# Patient Record
Sex: FEMALE | Race: WHITE | Employment: FULL TIME | URBAN - METROPOLITAN AREA
[De-identification: names, ages, dates, MRNs, and addresses within clinical notes are randomized per-mention and may not be internally consistent; named-entity substitution may affect disease eponyms.]

---

## 2017-12-13 ENCOUNTER — OFFICE VISIT (OUTPATIENT)
Dept: FAMILY MEDICINE CLINIC | Age: 47
End: 2017-12-13

## 2017-12-13 VITALS
RESPIRATION RATE: 20 BRPM | DIASTOLIC BLOOD PRESSURE: 87 MMHG | BODY MASS INDEX: 33.99 KG/M2 | HEART RATE: 78 BPM | HEIGHT: 65 IN | TEMPERATURE: 97.5 F | OXYGEN SATURATION: 96 % | WEIGHT: 204 LBS | SYSTOLIC BLOOD PRESSURE: 130 MMHG

## 2017-12-13 DIAGNOSIS — L70.0 ACNE VULGARIS: Primary | ICD-10-CM

## 2017-12-13 DIAGNOSIS — K21.9 GASTROESOPHAGEAL REFLUX DISEASE, ESOPHAGITIS PRESENCE NOT SPECIFIED: ICD-10-CM

## 2017-12-13 DIAGNOSIS — R51.9 NONINTRACTABLE EPISODIC HEADACHE, UNSPECIFIED HEADACHE TYPE: ICD-10-CM

## 2017-12-13 DIAGNOSIS — M19.90 ARTHRITIS: ICD-10-CM

## 2017-12-13 DIAGNOSIS — F41.8 SITUATIONAL ANXIETY: ICD-10-CM

## 2017-12-13 DIAGNOSIS — E66.9 OBESITY (BMI 30.0-34.9): ICD-10-CM

## 2017-12-13 RX ORDER — BUTALBITAL AND ACETAMINOPHEN 325; 50 MG/1; MG/1
TABLET ORAL
COMMUNITY
End: 2017-12-13 | Stop reason: SDUPTHER

## 2017-12-13 RX ORDER — OMEPRAZOLE 40 MG/1
40 CAPSULE, DELAYED RELEASE ORAL DAILY
Qty: 30 CAP | Refills: 6 | Status: SHIPPED | OUTPATIENT
Start: 2017-12-13 | End: 2018-12-11 | Stop reason: SDUPTHER

## 2017-12-13 RX ORDER — LORAZEPAM 1 MG/1
TABLET ORAL
COMMUNITY
End: 2017-12-13 | Stop reason: SDUPTHER

## 2017-12-13 RX ORDER — OMEPRAZOLE 40 MG/1
40 CAPSULE, DELAYED RELEASE ORAL DAILY
COMMUNITY
End: 2017-12-13 | Stop reason: SDUPTHER

## 2017-12-13 RX ORDER — LORAZEPAM 1 MG/1
1 TABLET ORAL
Qty: 30 TAB | Refills: 0 | Status: SHIPPED | OUTPATIENT
Start: 2017-12-13 | End: 2018-12-11 | Stop reason: SDUPTHER

## 2017-12-13 RX ORDER — CELECOXIB 200 MG/1
CAPSULE ORAL 2 TIMES DAILY
COMMUNITY
End: 2017-12-13 | Stop reason: SDUPTHER

## 2017-12-13 RX ORDER — BUTALBITAL, ACETAMINOPHEN AND CAFFEINE 50; 325; 40 MG/1; MG/1; MG/1
1 TABLET ORAL
Qty: 30 TAB | Refills: 2 | Status: SHIPPED | OUTPATIENT
Start: 2017-12-13 | End: 2018-11-28 | Stop reason: SDUPTHER

## 2017-12-13 RX ORDER — RANITIDINE 150 MG/1
150 TABLET, FILM COATED ORAL
Qty: 60 TAB | Refills: 11 | Status: SHIPPED | OUTPATIENT
Start: 2017-12-13 | End: 2018-12-11 | Stop reason: SDUPTHER

## 2017-12-13 RX ORDER — CELECOXIB 200 MG/1
200 CAPSULE ORAL 2 TIMES DAILY
Qty: 60 CAP | Refills: 6 | Status: SHIPPED | OUTPATIENT
Start: 2017-12-13 | End: 2018-05-29 | Stop reason: SDUPTHER

## 2017-12-13 RX ORDER — SPIRONOLACTONE 50 MG/1
50 TABLET, FILM COATED ORAL 3 TIMES DAILY
Qty: 90 TAB | Refills: 6 | Status: SHIPPED | OUTPATIENT
Start: 2017-12-13 | End: 2018-11-28 | Stop reason: SDUPTHER

## 2017-12-13 RX ORDER — SPIRONOLACTONE 50 MG/1
TABLET, FILM COATED ORAL 3 TIMES DAILY
COMMUNITY
End: 2017-12-13 | Stop reason: SDUPTHER

## 2017-12-13 NOTE — PROGRESS NOTES
OFFICE NOTE    Whit Kendrick is a 49 y.o. female presenting today for office visit. 12/13/2017  2:14 PM    Chief Complaint   Patient presents with   [address]     pt here to establish care    Medication Refill     pt here for medication refill       HPI: Here today as a new patient, establishing care. Used to follow with Dr Allyson Woodson at [facility]- no records available for review. States that labs last done about 3 months ago. Does not follow with any specialists at this time. Acne/GERD: Currently taking Spironolactone TID for adult acne- chronic medication- keeps condition controlled. Originally started by dermatology- wishes to continue. Takes Prilosec PRN for control of GERD- heard that it has some bad effects to use long term. Has heartburn with eating of red sauce pasta or spicy foods. Headaches/Arthritis: Intermittent headaches that have been happening for years- no changes in chronic headaches. Managed with PRN Fioricet. Taking Celebrex for arthritis that she reports has been diagnosed in bilateral knees- keeps pain controlled. Situational anxiety: Diagnosed with in the past- has trouble at times with stressful times, during her menstrual cycle, and with high bridges. Typically uses Ativan 1 mg about 2-4 times per month. Denies any depression or generalized anxiety. Would like to discuss weight loss and the medication Contrave. Has been on Qsymia in the past and saw weight clinics before. Reports that as her weight increases, her knee pain worsens. Reports that she eats a healthy diet. Review of Systems   Constitutional: Negative for appetite change, chills, fatigue, fever and unexpected weight change. Respiratory: Negative for cough, shortness of breath and wheezing. Cardiovascular: Negative for chest pain, palpitations and leg swelling. Gastrointestinal: Negative for abdominal pain, constipation, diarrhea, nausea and vomiting.    Genitourinary: Negative for difficulty urinating and frequency. Musculoskeletal: Positive for arthralgias. Negative for myalgias. Skin: Negative for rash. Neurological: Negative for dizziness and headaches. Psychiatric/Behavioral: Negative for agitation, behavioral problems, decreased concentration, dysphoric mood, hallucinations, self-injury, sleep disturbance and suicidal ideas. The patient is not nervous/anxious and is not hyperactive. PHQ Screening   PHQ over the last two weeks 12/13/2017   Little interest or pleasure in doing things Not at all   Feeling down, depressed or hopeless Not at all   Total Score PHQ 2 0         History  Past Medical History:   Diagnosis Date    Acne     Arthritis of knee     patient reported    GERD (gastroesophageal reflux disease)     Headache     Situational anxiety        Past Surgical History:   Procedure Laterality Date    HX HEENT      EYE SURGERY AS A CHILD       Social History     Social History    Marital status:      Spouse name: N/A    Number of children: N/A    Years of education: N/A     Occupational History    Not on file. Social History Main Topics    Smoking status: Never Smoker    Smokeless tobacco: Never Used    Alcohol use No    Drug use: No    Sexual activity: Yes     Other Topics Concern    Not on file     Social History Narrative    No narrative on file       Family History   Problem Relation Age of Onset    Cancer Mother     Arthritis-osteo Mother     Arthritis-osteo Father     Arthritis-osteo Maternal Grandfather        Allergies   Allergen Reactions    Latex Hives    Tramadol Hives       Current Outpatient Prescriptions   Medication Sig Dispense Refill    spironolactone (ALDACTONE) 50 mg tablet Take  by mouth three (3) times daily.  celecoxib (CELEBREX) 200 mg capsule Take  by mouth two (2) times a day.  omeprazole (PRILOSEC) 40 mg capsule Take 40 mg by mouth daily.       LORazepam (ATIVAN) 1 mg tablet Take  by mouth every eight (8) hours as needed for Anxiety.  butalbital-acetaminophen (PHRENILIN)  mg tablet Take  by mouth. Health Maintenance and Screenings  Colon Cancer: . Not indicated for early screening   COPD Screen/ Lung Cancer: Nonsmoker  CAD Screen: Will get records from [facility]  Diabetes: As above  STD and HIV Screen:   Breast Cancer: As above  Cervical Cancer: Reports that she has not had in about 11 years- recommend well woman  Osteoporosis Screen: Not indicated for early screening   Abdominal Aneurysm Screen: Not indicated  Opthalmology:   Dentistry Services:   Hearing Impairment:   Skin Cancer Screen:   Obesity Screen: Body mass index is 33.95 kg/(m^2). *I have reviewed/discussed the above normal BMI with the patient. I have recommended the following interventions: dietary management education, guidance, and counseling, encourage exercise and monitor weight . Flu Vaccination: Had 17/18 season  Pneumococcal Vaccine: Not indicated for early vaccination  Shingles Vaccine: Not indicated for early vaccination  Tetanus Booster: Unsure of last booster- no medical indication today  Hepatitis B Vaccinations:     *will follow up on      Advance Care Planning:   Patient was offered the opportunity to discuss advance care planning NO   Does patient have an Advance Directive:  NO   If no, did you provide information on [facility]ctions? Patient Care Team:  Patient Care Team:  Jose L Quiñonez NP as PCP - General (Nurse Practitioner)        LABS:  None new to review    RADIOLOGY:  None new to review        Physical Exam   Constitutional: She is oriented to person, place, and time. She appears well-developed and well-nourished. No distress. Neck: Normal range of motion. Neck supple. No thyromegaly present. Cardiovascular: Normal rate, regular rhythm and normal heart sounds. No murmur heard. Pulmonary/Chest: Effort normal and breath sounds normal. No respiratory distress. Abdominal: Soft. Bowel sounds are normal. There is no tenderness. Musculoskeletal: She exhibits no edema. -pain reported bilateral knees with ROM   Neurological: She is alert and oriented to person, place, and time. She exhibits normal muscle tone. Coordination and gait normal.   Skin: Skin is warm and dry. Psychiatric: Her speech is normal and behavior is normal. Judgment normal. Her mood appears not anxious. She is not hyperactive, not withdrawn and not actively hallucinating. Cognition and memory are normal. She does not express impulsivity. She does not exhibit a depressed mood. She expresses no homicidal and no suicidal ideation. Vitals:    12/13/17 1349   BP: 130/87   Pulse: 78   Resp: 20   Temp: 97.5 °F (36.4 °C)   TempSrc: Oral   SpO2: 96%   Weight: 204 lb (92.5 kg)   Height: 5' 5\" (1.651 m)   PainSc:   5   PainLoc: Knee   LMP: 12/08/2017       Controlled Substance Review:  Patient has situational anxiety, primarily affecting episodic times in her life. Control: Current: well controlled    Activities of Daily Living: well controlled; Are you able to do your normal daily activities? yes. Is there anything you still wish you could do but can't? no.    Goals of therapy: anxiety management  Adverse side effects: Since starting your medicine are you experiencing any of the following? Constipation, fatigue, lapses in memory, depression or sexual dysfunction no. Pill count: Consistent with patient's prescription? N/A- bottles not brought to visit. Urine drug screen: N/A at this time. [facility] Prescription Monitoring [facility] check performed:  yes  Agreement on file: Not of file. Specialist(s) involved: none at this time    Aberrant behaviors: None. Concurrent opioid use: no  Concurrent use of other sedating meds: no  Self dose escalation: no  Increased risk of adverse effects: Such as pre-existing constipation, nausea, pulmonary disease, and/or cognitive impairment.  no    Education: Patient education on safety: Reviewed side effects, adverse effects, predicted effectiveness, and plan. Take medications as prescribed; do not break, crush, or chew medication unless directed; do not drink alcohol or take illegal drugs; do not drive or operate machinery until you can think clearly; keep medicine in safe and secure place; check with provider before using any OTC medications; chronic use leads to dependency and withdrawal symptoms may occur if you stop taking. Continue current plan: Established plan today  Changes made: Plan established- Ativan 1 mg #30 tablets for 6-12 months        Assessment and Plan    Acne vulgaris  *Refilled. Stable on medication. Will obtain last labs and repeat every 6-12 months as needed. - spironolactone (ALDACTONE) 50 mg tablet; Take 1 Tab by mouth three (3) times daily. Dispense: 90 Tab; Refill: 6    Gastroesophageal reflux disease, esophagitis presence not specified  *Refilled on Prilosec, trial of Zantac ordered to replace PPI if possible. - raNITIdine (ZANTAC) 150 mg tablet; Take 1 Tab by mouth every twelve (12) hours as needed for Indigestion. Dispense: 60 Tab; Refill: 11  - omeprazole (PRILOSEC) 40 mg capsule; Take 1 Cap by mouth daily. Dispense: 30 Cap; Refill: 6    Nonintractable episodic headache, unspecified headache type  *Refilled. Effective at management. - butalbital-acetaminophen-caffeine (FIORICET, ESGIC) -40 mg per tablet; Take 1 Tab by mouth every twelve (12) hours as needed for Headache. Max Daily Amount: 2 Tabs. Dispense: 30 Tab; Refill: 2    Arthritis  *Refilled. Has been effective at managing. Will await records. - celecoxib (CELEBREX) 200 mg capsule; Take 1 Cap by mouth two (2) times a day. Dispense: 60 Cap; Refill: 6    Situational anxiety  *Refilled, no  noted. Advised on recommendation for infrequent usage- truly only when needed. Will continue to monitor use. She is aware of dependency for medication.   - LORazepam (ATIVAN) 1 mg tablet;  Take 1 Tab by mouth every twelve (12) hours as needed for Anxiety. Max Daily Amount: 2 mg. Dispense: 30 Tab; Refill: 0    Obesity (BMI 30.0-34.9)  *Discussed with patient. Will trial Contrave- follow up in about one month for weigh in. Advised on side effects and predicted effectiveness. Advised on copay card available on medication's website.   - naltrexone-buPROPion (CONTRAVE) 8-90 mg TbER ER tablet; First Month: Contrave 8mg/90mg #70  Week 1 : 1 Tab AM  Week 2 : 1 Tab AM, 1 Tab PM  Week 3 : 2 Tab AM, 1 Tab PM  Week 4 : 2 Tab AM, 2 Tab PM  Week 5 and Beyond: Contrave 8mg/90mg #120   2 Tab AM, 2 Tab PM  Dispense: 120 Tab; Refill: 0        *Plan of care reviewed with patient. Patient in agreement with plan and expresses understanding. All questions answered and patient encouraged to call or RTO if further questions or concerns. Follow-up Disposition:  Return in about 1 month (around 1/13/2018) for well-woman visit and weight management. Adam Berrios

## 2017-12-13 NOTE — MR AVS SNAPSHOT
Visit Information Date & Time Provider Department Dept. Phone Encounter #  
 12/13/2017  1:30 PM Lyssa Cortez [facility] [phone] [id number] Follow-up Instructions Return in about 1 month (around 1/13/2018) for well-woman visit and weight management. Oliver Ureña Upcoming Health Maintenance Date Due DTaP/Tdap/Td series (1 - Tdap) 6/21/1990 PAP AKA CERVICAL CYTOLOGY 6/21/1990 Influenza Age 5 to Adult 8/1/2017 Allergies as of 12/13/2017  Review Complete On: 12/13/2017 By: Lyssa Cortez NP Severity Noted Reaction Type Reactions Latex High 12/13/2017   Systemic Hives Tramadol High 12/13/2017   Systemic Hives Current Immunizations  Never Reviewed No immunizations on file. Not reviewed this visit You Were Diagnosed With   
  
 Codes Comments Acne vulgaris    -  Primary ICD-10-CM: L70.0 ICD-9-CM: 706.1 Nonintractable episodic headache, unspecified headache type     ICD-10-CM: R51 ICD-9-CM: 784.0 Obesity (BMI 30.0-34.9)     ICD-10-CM: M22.3 ICD-9-CM: 278.00 Gastroesophageal reflux disease, esophagitis presence not specified     ICD-10-CM: K21.9 ICD-9-CM: 530.81 Arthritis     ICD-10-CM: M19.90 ICD-9-CM: 716.90 Situational anxiety     ICD-10-CM: F41.8 ICD-9-CM: 300.09 Vitals BP Pulse Temp Resp Height(growth percentile) Weight(growth percentile) 130/87 (BP 1 Location: Left arm, BP Patient Position: Sitting) 78 97.5 °F (36.4 °C) (Oral) 20 5' 5\" (1.651 m) 204 lb (92.5 kg) LMP SpO2 BMI Smoking Status 12/08/2017 96% 33.95 kg/m2 Never Smoker Vitals History BMI and BSA Data Body Mass Index Body Surface Area 33.95 kg/m 2 2.06 m 2 Preferred Pharmacy Pharmacy Name Phone [facility] [address], [address] [phone] Your Updated Medication List  
  
   
 This list is accurate as of: 12/13/17  2:39 PM.  Always use your most recent med list.  
  
  
  
  
 butalbital-acetaminophen  mg tablet Commonly known as:  Alverna Jeanne Take  by mouth. butalbital-acetaminophen-caffeine -40 mg per tablet Commonly known as:  Mardeen Ho Take 1 Tab by mouth every twelve (12) hours as needed for Headache. Max Daily Amount: 2 Tabs. celecoxib 200 mg capsule Commonly known as:  CeleBREX Take 1 Cap by mouth two (2) times a day. * CONTRAVE PO Take  by mouth. * naltrexone-buPROPion 8-90 mg Tber ER tablet Commonly known as:  Erika Guzman First Month: Contrave 8mg/90mg #70 Week 1 : 1 Tab AM Week 2 : 1 Tab AM, 1 Tab PM Week 3 : 2 Tab AM, 1 Tab PM Week 4 : 2 Tab AM, 2 Tab PM Week 5 and Beyond: Contrave 8mg/90mg #120  2 Tab AM, 2 Tab PM  
  
 LORazepam 1 mg tablet Commonly known as:  ATIVAN Take 1 Tab by mouth every twelve (12) hours as needed for Anxiety. Max Daily Amount: 2 mg.  
  
 omeprazole 40 mg capsule Commonly known as:  PRILOSEC Take 1 Cap by mouth daily. raNITIdine 150 mg tablet Commonly known as:  ZANTAC Take 1 Tab by mouth every twelve (12) hours as needed for Indigestion. spironolactone 50 mg tablet Commonly known as:  ALDACTONE Take 1 Tab by mouth three (3) times daily. * Notice: This list has 2 medication(s) that are the same as other medications prescribed for you. Read the directions carefully, and ask your doctor or other care provider to review them with you. Prescriptions Printed Refills  
 naltrexone-buPROPion (CONTRAVE) 8-90 mg TbER ER tablet 0 Sig: First Month: Contrave 8mg/90mg #70 Week 1 : 1 Tab AM 
Week 2 : 1 Tab AM, 1 Tab PM 
Week 3 : 2 Tab AM, 1 Tab PM 
Week 4 : 2 Tab AM, 2 Tab PM 
Week 5 and Beyond: Contrave 8mg/90mg #120 
 2 Tab AM, 2 Tab PM  
 Class: Print  LORazepam (ATIVAN) 1 mg tablet 0  
 Sig: Take 1 Tab by mouth every twelve (12) hours as needed for Anxiety. Max Daily Amount: 2 mg. Class: Print Route: Oral  
 butalbital-acetaminophen-caffeine (FIORICET, ESGIC) -40 mg per tablet 2 Sig: Take 1 Tab by mouth every twelve (12) hours as needed for Headache. Max Daily Amount: 2 Tabs. Class: Print Route: Oral  
  
Prescriptions Sent to Pharmacy Refills  
 raNITIdine (ZANTAC) 150 mg tablet 11 Sig: Take 1 Tab by mouth every twelve (12) hours as needed for Indigestion. Class: Normal  
 Pharmacy: [address], [address] [place] [address] Ph #: [phone] Route: Oral  
 celecoxib (CELEBREX) 200 mg capsule 6 Sig: Take 1 Cap by mouth two (2) times a day. Class: Normal  
 Pharmacy: [address], [address] [place] [address] Ph #: [phone] Route: Oral  
 spironolactone (ALDACTONE) 50 mg tablet 6 Sig: Take 1 Tab by mouth three (3) times daily. Class: Normal  
 Pharmacy: [address], [address] [place] [address] Ph #: [phone] Route: Oral  
 omeprazole (PRILOSEC) 40 mg capsule 6 Sig: Take 1 Cap by mouth daily. Class: Normal  
 Pharmacy: [address], [address] [place] [address] Ph #: [phone] Route: Oral  
  
Follow-up Instructions Return in about 1 month (around 1/13/2018) for well-woman visit and weight management. .  
  
  
Patient Instructions Naltrexone/Bupropion (Contrave) - (By mouth) Why this medicine is used:  
Used with diet and exercise to help you lose weight. Contact a nurse or doctor right away if you have: · Blistering, peeling, or red skin rash · Fast, slow, or pounding heartbeat, chest pain, trouble breathing · Thoughts of hurting yourself, depression, agitation or confusion, increased energy · Seeing or hearing things that are not there, racing thoughts, trouble sleeping · Muscle or joint pain, fever with rash, seizures · Dark urine or pale stools, yellow skin or eyes · Eye pain, vision changes, seeing halos around lights · Dry mouth, nausea, vomiting, loss of appetite, stomach pain, diarrhea, constipation © 2017 [address] Information is for End User's use only and may not be sold, redistributed or otherwise used for commercial purposes. Introducing [facility]! Aries Braun introduces [employer] patient portal. Now you can access parts of your medical record, email your doctor's office, and request medication refills online. 1. In your internet browser, go to [email or web address] 2. Click on the First Time User? Click Here link in the Sign In box. You will see the New Member Sign Up page. 3. Enter your [employer] Access Code exactly as it appears below. You will not need to use this code after youve completed the sign-up process. If you do not sign up before the expiration date, you must request a new code. · [employer] Access Code: [email or web address] Expires: 3/13/2018  1:31 PM 
 
4. Enter the last four digits of your Social Security Number (xxxx) and Date of Birth (mm/dd/yyyy) as indicated and click Submit. You will be taken to the next sign-up page. 5. Create a [employer] ID. This will be your [employer] login ID and cannot be changed, so think of one that is secure and easy to remember. 6. Create a [employer] password. You can change your password at any time. 7. Enter your Password Reset Question and Answer. This can be used at a later time if you forget your password. 8. Enter your e-mail address. You will receive e-mail notification when new information is available in [address] 9. Click Sign Up. You can now view and download portions of your medical record. 10. Click the Download Summary menu link to download a portable copy of your medical information.  
 
If you have questions, please visit the Frequently Asked Questions section of the [employer]. Remember, [employer] is NOT to be used for urgent needs. For medical emergencies, dial 911. Now available from your iPhone and Android! Please provide this summary of care documentation to your next provider. Your primary care clinician is listed as Clifton Monae. If you have any questions after today's visit, please call [phone].

## 2017-12-13 NOTE — PATIENT INSTRUCTIONS
Naltrexone/Bupropion (Contrave) - (By mouth)   Why this medicine is used:   Used with diet and exercise to help you lose weight. Contact a nurse or doctor right away if you have:  · Blistering, peeling, or red skin rash  · Fast, slow, or pounding heartbeat, chest pain, trouble breathing  · Thoughts of hurting yourself, depression, agitation or confusion, increased energy  · Seeing or hearing things that are not there, racing thoughts, trouble sleeping  · Muscle or joint pain, fever with rash, seizures  · Dark urine or pale stools, yellow skin or eyes  · Eye pain, vision changes, seeing halos around lights  · Dry mouth, nausea, vomiting, loss of appetite, stomach pain, diarrhea, constipation   © 2017 [address] Information is for End User's use only and may not be sold, redistributed or otherwise used for commercial purposes.

## 2017-12-14 ENCOUNTER — TELEPHONE (OUTPATIENT)
Dept: FAMILY MEDICINE CLINIC | Age: 47
End: 2017-12-14

## 2017-12-14 DIAGNOSIS — Z79.899 ON POTASSIUM SPARING DIURETIC THERAPY: ICD-10-CM

## 2017-12-14 DIAGNOSIS — Z13.1 SCREENING FOR DIABETES MELLITUS: ICD-10-CM

## 2017-12-14 DIAGNOSIS — Z13.0 SCREENING FOR ENDOCRINE, METABOLIC AND IMMUNITY DISORDER: Primary | ICD-10-CM

## 2017-12-14 DIAGNOSIS — Z13.29 SCREENING FOR ENDOCRINE, METABOLIC AND IMMUNITY DISORDER: Primary | ICD-10-CM

## 2017-12-14 DIAGNOSIS — Z13.6 ENCOUNTER FOR LIPID SCREENING FOR CARDIOVASCULAR DISEASE: ICD-10-CM

## 2017-12-14 DIAGNOSIS — Z13.220 ENCOUNTER FOR LIPID SCREENING FOR CARDIOVASCULAR DISEASE: ICD-10-CM

## 2017-12-14 DIAGNOSIS — E55.9 VITAMIN D DEFICIENCY: ICD-10-CM

## 2017-12-14 DIAGNOSIS — Z13.228 SCREENING FOR ENDOCRINE, METABOLIC AND IMMUNITY DISORDER: Primary | ICD-10-CM

## 2017-12-14 NOTE — TELEPHONE ENCOUNTER
Return call made to pt and made aware. Pt wants to have labs done, scheduled for tomorrow morning. Aware that labs are fasting.

## 2017-12-14 NOTE — TELEPHONE ENCOUNTER
Patient wants to know if she can have her labs done before the first of year (for insurance purposes) She states Butts Fiorbrittney mentioned labs at her appointment 12/13/17 but did not put in any orders.

## 2017-12-14 NOTE — TELEPHONE ENCOUNTER
12/14/2017  4:28 PM    Chief Complaint   Patient presents with    New Order       Noted request for labs to be completed. Evaluated yesterday in office and we had discussed waiting until I got her previous PCPs records to determine if and when she needed labs done. However, due to request, orders placed for basic labs. She can come into office to have labs done, go to [facility] without slip, or she can come  slip in office and have done at [facility]. She will need to be fasting 8-10 hours. Forwarded to clinical staff to make patient aware of options.

## 2017-12-15 ENCOUNTER — TELEPHONE (OUTPATIENT)
Dept: FAMILY MEDICINE CLINIC | Age: 47
End: 2017-12-15

## 2017-12-15 ENCOUNTER — HOSPITAL ENCOUNTER (OUTPATIENT)
Dept: LAB | Age: 47
Discharge: HOME | End: 2017-12-15
Payer: COMMERCIAL

## 2017-12-15 ENCOUNTER — LAB ONLY (OUTPATIENT)
Dept: FAMILY MEDICINE CLINIC | Age: 47
End: 2017-12-15

## 2017-12-15 DIAGNOSIS — Z13.1 SCREENING FOR DIABETES MELLITUS: ICD-10-CM

## 2017-12-15 DIAGNOSIS — M19.90 ARTHRITIS: Primary | ICD-10-CM

## 2017-12-15 DIAGNOSIS — Z13.228 SCREENING FOR ENDOCRINE, METABOLIC AND IMMUNITY DISORDER: ICD-10-CM

## 2017-12-15 DIAGNOSIS — Z13.220 ENCOUNTER FOR LIPID SCREENING FOR CARDIOVASCULAR DISEASE: ICD-10-CM

## 2017-12-15 DIAGNOSIS — E55.9 VITAMIN D DEFICIENCY: ICD-10-CM

## 2017-12-15 DIAGNOSIS — Z13.6 ENCOUNTER FOR LIPID SCREENING FOR CARDIOVASCULAR DISEASE: ICD-10-CM

## 2017-12-15 DIAGNOSIS — Z13.0 SCREENING FOR ENDOCRINE, METABOLIC AND IMMUNITY DISORDER: ICD-10-CM

## 2017-12-15 DIAGNOSIS — Z13.29 SCREENING FOR ENDOCRINE, METABOLIC AND IMMUNITY DISORDER: ICD-10-CM

## 2017-12-15 DIAGNOSIS — R51.9 NONINTRACTABLE EPISODIC HEADACHE, UNSPECIFIED HEADACHE TYPE: ICD-10-CM

## 2017-12-15 DIAGNOSIS — Z79.899 ON POTASSIUM SPARING DIURETIC THERAPY: ICD-10-CM

## 2017-12-15 LAB
ALBUMIN SERPL-MCNC: 3.9 G/DL (ref 3.4–5)
ALBUMIN/GLOB SERPL: 1 {RATIO} (ref 0.8–1.7)
ALP SERPL-CCNC: 150 U/L (ref 45–117)
ALT SERPL-CCNC: 18 U/L (ref 13–56)
ANION GAP SERPL CALC-SCNC: 9 MMOL/L (ref 3–18)
APPEARANCE UR: CLEAR
AST SERPL-CCNC: 12 U/L (ref 15–37)
BACTERIA URNS QL MICRO: ABNORMAL /HPF
BILIRUB SERPL-MCNC: 0.4 MG/DL (ref 0.2–1)
BILIRUB UR QL: NEGATIVE
BUN SERPL-MCNC: 18 MG/DL (ref 7–18)
BUN/CREAT SERPL: 24 (ref 12–20)
CALCIUM SERPL-MCNC: 9.4 MG/DL (ref 8.5–10.1)
CAOX CRY URNS QL MICRO: ABNORMAL
CHLORIDE SERPL-SCNC: 101 MMOL/L (ref 100–108)
CHOLEST SERPL-MCNC: 208 MG/DL
CO2 SERPL-SCNC: 26 MMOL/L (ref 21–32)
COLOR UR: YELLOW
CREAT SERPL-MCNC: 0.75 MG/DL (ref 0.6–1.3)
EPITH CASTS URNS QL MICRO: ABNORMAL /LPF (ref 0–5)
GLOBULIN SER CALC-MCNC: 3.9 G/DL (ref 2–4)
GLUCOSE SERPL-MCNC: 79 MG/DL (ref 74–99)
GLUCOSE UR STRIP.AUTO-MCNC: NEGATIVE MG/DL
HDLC SERPL-MCNC: 59 MG/DL (ref 40–60)
HDLC SERPL: 3.5 {RATIO} (ref 0–5)
HGB UR QL STRIP: NEGATIVE
KETONES UR QL STRIP.AUTO: NEGATIVE MG/DL
LDLC SERPL CALC-MCNC: 119.6 MG/DL (ref 0–100)
LEUKOCYTE ESTERASE UR QL STRIP.AUTO: NEGATIVE
LIPID PROFILE,FLP: ABNORMAL
MUCOUS THREADS URNS QL MICRO: ABNORMAL /LPF
NITRITE UR QL STRIP.AUTO: NEGATIVE
PH UR STRIP: 6 [PH] (ref 5–8)
POTASSIUM SERPL-SCNC: 4.4 MMOL/L (ref 3.5–5.5)
PROT SERPL-MCNC: 7.8 G/DL (ref 6.4–8.2)
PROT UR STRIP-MCNC: NEGATIVE MG/DL
RBC #/AREA URNS HPF: ABNORMAL /HPF (ref 0–5)
SODIUM SERPL-SCNC: 136 MMOL/L (ref 136–145)
SP GR UR REFRACTOMETRY: >1.03 (ref 1–1.03)
TRIGL SERPL-MCNC: 147 MG/DL (ref ?–150)
TSH SERPL DL<=0.05 MIU/L-ACNC: 1.89 UIU/ML (ref 0.36–3.74)
UROBILINOGEN UR QL STRIP.AUTO: 0.2 EU/DL (ref 0.2–1)
VLDLC SERPL CALC-MCNC: 29.4 MG/DL
WBC URNS QL MICRO: ABNORMAL /HPF (ref 0–4)

## 2017-12-15 PROCEDURE — 84443 ASSAY THYROID STIM HORMONE: CPT | Performed by: NURSE PRACTITIONER

## 2017-12-15 PROCEDURE — 80053 COMPREHEN METABOLIC PANEL: CPT | Performed by: NURSE PRACTITIONER

## 2017-12-15 PROCEDURE — 80061 LIPID PANEL: CPT | Performed by: NURSE PRACTITIONER

## 2017-12-15 PROCEDURE — 81001 URINALYSIS AUTO W/SCOPE: CPT | Performed by: NURSE PRACTITIONER

## 2017-12-15 PROCEDURE — 82652 VIT D 1 25-DIHYDROXY: CPT | Performed by: NURSE PRACTITIONER

## 2017-12-15 RX ORDER — ONDANSETRON 4 MG/1
4 TABLET, ORALLY DISINTEGRATING ORAL
Qty: 15 TAB | Refills: 0 | Status: SHIPPED | OUTPATIENT
Start: 2017-12-15 | End: 2018-11-28 | Stop reason: SDUPTHER

## 2017-12-15 NOTE — TELEPHONE ENCOUNTER
Arturo Adler made aware of pts complaint, will send is some Zofran for nausea, however, did discuss this was a side effect of the medication. It made decrease as she continues to take it. Pt want medication to be sent to the [facility] in [place].

## 2017-12-15 NOTE — TELEPHONE ENCOUNTER
Patient called stating the Contrave was making her nauseous. She didn't know if something could be called in to help with this. Please advise.

## 2017-12-15 NOTE — PROGRESS NOTES
Patient presents for lab draw ordered by: Landry Mckeon NP    Ordering Provider:  Landry Mckeon NP  Ordering Department/Practice:   Phone: [phone]  Date Ordered:  12/14/2017    The following labs were drawn and sent to [facility]  by Brina Silveira LPN:    CMP  LIPID,TSH,Vit D, U/A  The following tubes were sent:

## 2017-12-19 LAB — 1,25(OH)2D3 SERPL-MCNC: 44.4 PG/ML (ref 19.9–79.3)

## 2017-12-26 DIAGNOSIS — E66.9 OBESITY (BMI 30.0-34.9): Primary | ICD-10-CM

## 2017-12-26 NOTE — TELEPHONE ENCOUNTER
Patient called stating she is currently taking Contrave and this makes very dizzy and sick. She is requesting Qsymia or something else.

## 2017-12-27 NOTE — TELEPHONE ENCOUNTER
12/27/2017  11:18 AM    Chief Complaint   Patient presents with    Medication Problem       Noted complaint of dizziness with Contrave. Previously complained of nausea. Will switch at this time to Qsymia. Rx printed for first 2 week supply. Will need to come into office for evaluation before the end of the 2 week supply. Forwarded to clinical staff to let patient know that Rx is available in office for  (controlled substance) and that she needs an appointment in about 2 weeks.

## 2017-12-29 NOTE — TELEPHONE ENCOUNTER
Call made to pt and pt made aware of new script for Qsymia was here ready for . Pt needs a follow up within 2 weeks to be evaluated. Has a 2 weeks supply. Pt already has an appointment scheduled for 1/10/18.

## 2018-01-10 ENCOUNTER — OFFICE VISIT (OUTPATIENT)
Dept: FAMILY MEDICINE CLINIC | Age: 48
End: 2018-01-10

## 2018-01-10 ENCOUNTER — TELEPHONE (OUTPATIENT)
Dept: FAMILY MEDICINE CLINIC | Age: 48
End: 2018-01-10

## 2018-01-10 ENCOUNTER — HOSPITAL ENCOUNTER (OUTPATIENT)
Dept: LAB | Age: 48
Discharge: HOME | End: 2018-01-10
Payer: COMMERCIAL

## 2018-01-10 VITALS
HEART RATE: 81 BPM | TEMPERATURE: 97.4 F | HEIGHT: 65 IN | WEIGHT: 202 LBS | DIASTOLIC BLOOD PRESSURE: 92 MMHG | BODY MASS INDEX: 33.66 KG/M2 | SYSTOLIC BLOOD PRESSURE: 128 MMHG | OXYGEN SATURATION: 98 % | RESPIRATION RATE: 20 BRPM

## 2018-01-10 DIAGNOSIS — Z12.4 SCREENING FOR CERVICAL CANCER: ICD-10-CM

## 2018-01-10 DIAGNOSIS — E66.9 OBESITY (BMI 30.0-34.9): ICD-10-CM

## 2018-01-10 DIAGNOSIS — Z12.31 ENCOUNTER FOR SCREENING MAMMOGRAM FOR BREAST CANCER: ICD-10-CM

## 2018-01-10 DIAGNOSIS — E66.9 OBESITY (BMI 30.0-34.9): Primary | ICD-10-CM

## 2018-01-10 DIAGNOSIS — Z01.419 WELL WOMAN EXAM WITH ROUTINE GYNECOLOGICAL EXAM: Primary | ICD-10-CM

## 2018-01-10 PROCEDURE — 88175 CYTOPATH C/V AUTO FLUID REDO: CPT | Performed by: NURSE PRACTITIONER

## 2018-01-10 PROCEDURE — 87624 HPV HI-RISK TYP POOLED RSLT: CPT | Performed by: NURSE PRACTITIONER

## 2018-01-10 NOTE — PROGRESS NOTES
OFFICE NOTE    Joshua Phillips is a 49 y.o. female presenting today for office visit. 1/10/2018  1:12 PM    Chief Complaint   Patient presents with    Follow Up Chronic Condition     pt her for follow up chronic conditions       HPI: Here today for well woman exam. Rashi Max. Patient's last menstrual period was 01/10/2018 (lmp unknown). Cycles are irregular- sometimes having spotting, sometimes a normal flow, skips months, and sometimes has several episodes in a month- has been happening this way for about a year or so. Sexually active with male partner- been together for \"many years. \" No breast pain or new or enlarging lumps on self exam, no discharge or pelvic pain. Also here for weight management follow up- has been taking Qsymia as prescribed. No side effects from therapy. Has been on for about 2 weeks. Would like to continue. Has had a weight loss of about 2 lbs. Last PAP: About 11 years ago  Mammo: Patient reports in 4/2017- need to get records from [address] [place] [address]: Has not been completed before- not postmenopausal or with risk factors        Review of Systems   Constitutional: Negative for chills, fatigue and fever. Respiratory: Negative for cough, shortness of breath and wheezing. Cardiovascular: Negative for chest pain, palpitations and leg swelling. Gastrointestinal: Negative for abdominal pain, constipation, diarrhea, nausea and vomiting. Genitourinary: Negative for decreased urine volume, difficulty urinating, dyspareunia, dysuria, flank pain, frequency, genital sores, menstrual problem, pelvic pain, urgency, vaginal discharge and vaginal pain. Musculoskeletal: Negative for arthralgias and myalgias. Skin: Negative for rash. Neurological: Negative for dizziness and headaches.          PHQ Screening   PHQ over the last two weeks 1/10/2018   Little interest or pleasure in doing things -   Feeling down, depressed or hopeless Not at all   Total Score PHQ 2 -         History  Past Medical History:   Diagnosis Date    Acne     Arthritis of knee     patient reported    GERD (gastroesophageal reflux disease)     Headache     Situational anxiety        Past Surgical History:   Procedure Laterality Date    HX HEENT      EYE SURGERY AS A CHILD       Social History     Social History    Marital status:      Spouse name: N/A    Number of children: N/A    Years of education: N/A     Occupational History    Not on file. Social History Main Topics    Smoking status: Never Smoker    Smokeless tobacco: Never Used    Alcohol use No    Drug use: No    Sexual activity: Yes     Other Topics Concern    Not on file     Social History Narrative       Family History   Problem Relation Age of Onset    Cancer Mother     Arthritis-osteo Mother     Arthritis-osteo Father     Arthritis-osteo Maternal Grandfather        Allergies   Allergen Reactions    Latex Hives    Tramadol Hives       Current Outpatient Prescriptions   Medication Sig Dispense Refill    phentermine-topiramate (QSYMIA) 3.75-23 mg CM24 Take 1 Cap by mouth daily. Max Daily Amount: 1 Cap. 14 Cap 0    ondansetron (ZOFRAN ODT) 4 mg disintegrating tablet Take 1 Tab by mouth every eight (8) hours as needed for Nausea. 15 Tab 0    raNITIdine (ZANTAC) 150 mg tablet Take 1 Tab by mouth every twelve (12) hours as needed for Indigestion. 60 Tab 11    celecoxib (CELEBREX) 200 mg capsule Take 1 Cap by mouth two (2) times a day. 60 Cap 6    spironolactone (ALDACTONE) 50 mg tablet Take 1 Tab by mouth three (3) times daily. 90 Tab 6    omeprazole (PRILOSEC) 40 mg capsule Take 1 Cap by mouth daily. 30 Cap 6    LORazepam (ATIVAN) 1 mg tablet Take 1 Tab by mouth every twelve (12) hours as needed for Anxiety. Max Daily Amount: 2 mg. 30 Tab 0    butalbital-acetaminophen-caffeine (FIORICET, ESGIC) -40 mg per tablet Take 1 Tab by mouth every twelve (12) hours as needed for Headache. Max Daily Amount: 2 Tabs.  30 Tab 2 Health Maintenance and Screenings  Colon Cancer: . Not indicated for early screening   COPD Screen/ Lung Cancer: Nonsmoker  CAD Screen: Will get records from [place]  Diabetes: As above  STD and HIV Screen:   Breast Cancer: As above  Cervical Cancer: PAP done today  Osteoporosis Screen: Not indicated for early screening   Abdominal Aneurysm Screen: Not indicated  Opthalmology:   Dentistry Services:   Hearing Impairment:   Skin Cancer Screen:   Obesity Screen: Body mass index is 33.95 kg/(m^2). Flu Vaccination: Had 17/18 season  Pneumococcal Vaccine: Not indicated for early vaccination  Shingles Vaccine: Not indicated for early vaccination  Tetanus Booster: Unsure of last booster- no medical indication today  Hepatitis B Vaccinations:     *will follow up on        Advance Care Planning:   Patient was offered the opportunity to discuss advance care planning NO   Does patient have an Advance Directive:  NO   If no, did you provide information on [facility]? Patient Care Team:  Patient Care Team:  Joy Cintron NP as PCP - General (Nurse Practitioner)        LABS:  None new to review    RADIOLOGY:  None new to review        Physical Exam   Constitutional: She is oriented to person, place, and time. She appears well-developed and well-nourished. No distress. Pulmonary/Chest: Effort normal. No respiratory distress. Right breast exhibits no inverted nipple, no mass, no nipple discharge and no skin change. Left breast exhibits no inverted nipple, no mass, no nipple discharge and no skin change. Abdominal: Soft. Bowel sounds are normal. There is no tenderness. Genitourinary: Rectum normal. There is no rash, tenderness or lesion on the right labia. There is no rash, tenderness or lesion on the left labia. Uterus is not deviated, not enlarged, not fixed and not tender. Cervix exhibits no motion tenderness, no discharge and no friability. Right adnexum displays no mass and no tenderness.  Left adnexum displays no mass and no tenderness. No bleeding in the vagina. No foreign body in the vagina. No vaginal discharge found. Lymphadenopathy:        Right: No inguinal adenopathy present. Left: No inguinal adenopathy present. Neurological: She is alert and oriented to person, place, and time. She exhibits normal muscle tone. Coordination normal.   Skin: Skin is warm and dry. Psychiatric: Her speech is normal and behavior is normal. Her mood appears not anxious. She does not exhibit a depressed mood. Vitals:    01/10/18 1258   BP: (!) 128/92   Pulse: 81   Resp: 20   Temp: 97.4 °F (36.3 °C)   TempSrc: Oral   SpO2: 98%   Weight: 202 lb (91.6 kg)   Height: 5' 5\" (1.651 m)   PainSc:   0 - No pain   LMP: 01/10/2018       Wt Readings from Last 3 Encounters:   01/10/18 202 lb (91.6 kg)   12/13/17 204 lb (92.5 kg)         Assessment and Plan    Well woman exam with routine gynecological exam/ Screening for cervical cancer  *Well woman completed today. PAP collected. No records available from [facility]- will resend request.   - PAP IG, APTIMA HPV AND RFX 16/18,45 ([id number]); Future    Encounter for screening mammogram for breast cancer  *Patient reports mammo done 4/2017 at [facility], will resend request.    Obesity (BMI 30.0-34.9)  *Tolerating medication well without side effects. Has been on medication for about 2 weeks. Will increase at this time and proceed with management for the next 3 months. Advised patient on side effects that may occur. *Encouraged to weigh self at home and use medication only as adjunct to healthy lifestyle. Informed her that she may come into office in between visits for weigh ins.   - phentermine-topiramate (QSYMIA) 7.5-46 mg CM24; Take 1 Cap by mouth daily. Max Daily Amount: 1 Cap. Dispense: 30 Cap; Refill: 2        *Plan of care reviewed with patient. Patient in agreement with plan and expresses understanding.  All questions answered and patient encouraged to call or RTO if further questions or concerns. Follow-up Disposition:  Return in about 3 months (around 4/10/2018) for chronic disease routine care- 30 min.

## 2018-01-10 NOTE — MR AVS SNAPSHOT
Visit Information Date & Time Provider Department Dept. Phone Encounter #  
 1/10/2018 12:30 PM Mj Rice, [address] [phone] [id number] Follow-up Instructions Return in about 3 months (around 4/10/2018) for chronic disease routine care- 30 min. Upcoming Health Maintenance Date Due DTaP/Tdap/Td series (1 - Tdap) 6/21/1990 PAP AKA CERVICAL CYTOLOGY 6/21/1990 Allergies as of 1/10/2018  Review Complete On: 1/10/2018 By: Mj Rice NP Severity Noted Reaction Type Reactions Latex High 12/13/2017   Systemic Hives Tramadol High 12/13/2017   Systemic Hives Current Immunizations  Never Reviewed Name Date Influenza Vaccine 11/1/2017, 10/2/2009 12:00 AM  
 TB Skin Test (PPD) 10/2/2009 12:00 AM  
  
 Not reviewed this visit You Were Diagnosed With   
  
 Codes Comments Well woman exam with routine gynecological exam    -  Primary ICD-10-CM: P32.790 ICD-9-CM: V72.31 Screening for cervical cancer     ICD-10-CM: Z12.4 ICD-9-CM: V76.2 Encounter for screening mammogram for breast cancer     ICD-10-CM: Z12.31 
ICD-9-CM: V76.12 Obesity (BMI 30.0-34.9)     ICD-10-CM: D69.6 ICD-9-CM: 278.00 Vitals BP Pulse Temp Resp Height(growth percentile) Weight(growth percentile) (!) 128/92 (BP 1 Location: Left arm, BP Patient Position: Sitting) 81 97.4 °F (36.3 °C) (Oral) 20 5' 5\" (1.651 m) 202 lb (91.6 kg) LMP SpO2 BMI OB Status Smoking Status 01/10/2018 (LMP Unknown) 98% 33.61 kg/m2 Unknown Never Smoker Vitals History BMI and BSA Data Body Mass Index Body Surface Area  
 33.61 kg/m 2 2.05 m 2 Preferred Pharmacy Pharmacy Name Phone [id number] [address] [place] [address] Your Updated Medication List  
  
   
This list is accurate as of: 1/10/18  1:30 PM.  Always use your most recent med list.  
  
  
  
  
 butalbital-acetaminophen-caffeine -40 mg per tablet Commonly known as:  Radha Ken Take 1 Tab by mouth every twelve (12) hours as needed for Headache. Max Daily Amount: 2 Tabs. celecoxib 200 mg capsule Commonly known as:  CeleBREX Take 1 Cap by mouth two (2) times a day. LORazepam 1 mg tablet Commonly known as:  ATIVAN Take 1 Tab by mouth every twelve (12) hours as needed for Anxiety. Max Daily Amount: 2 mg.  
  
 omeprazole 40 mg capsule Commonly known as:  PRILOSEC Take 1 Cap by mouth daily. ondansetron 4 mg disintegrating tablet Commonly known as:  ZOFRAN ODT Take 1 Tab by mouth every eight (8) hours as needed for Nausea. * phentermine-topiramate 3.75-23 mg Cm24 Commonly known as:  QSYMIA Take 1 Cap by mouth daily. Max Daily Amount: 1 Cap. * phentermine-topiramate 7.5-46 mg Cm24 Commonly known as:  QSYMIA Take 1 Cap by mouth daily. Max Daily Amount: 1 Cap. raNITIdine 150 mg tablet Commonly known as:  ZANTAC Take 1 Tab by mouth every twelve (12) hours as needed for Indigestion. spironolactone 50 mg tablet Commonly known as:  ALDACTONE Take 1 Tab by mouth three (3) times daily. * Notice: This list has 2 medication(s) that are the same as other medications prescribed for you. Read the directions carefully, and ask your doctor or other care provider to review them with you. Prescriptions Printed Refills  
 phentermine-topiramate (QSYMIA) 7.5-46 mg CM24 2 Sig: Take 1 Cap by mouth daily. Max Daily Amount: 1 Cap. Class: Print Route: Oral  
  
Follow-up Instructions Return in about 3 months (around 4/10/2018) for chronic disease routine care- 30 min. To-Do List   
 01/10/2018 Pathology:  PAP IG, APTIMA HPV AND RFX 16/18,45 ([id number]) Introducing [facility]!    
 Anjana Mclean introduces [employer] patient portal. Now you can access parts of your medical record, email your doctor's office, and request medication refills online. 1. In your internet browser, go to [email or web address] 2. Click on the First Time User? Click Here link in the Sign In box. You will see the New Member Sign Up page. 3. Enter your AllClear ID Access Code exactly as it appears below. You will not need to use this code after youve completed the sign-up process. If you do not sign up before the expiration date, you must request a new code. · AllClear ID Access Code: [email or web address] Expires: 3/13/2018  1:31 PM 
 
4. Enter the last four digits of your Social Security Number (xxxx) and Date of Birth (mm/dd/yyyy) as indicated and click Submit. You will be taken to the next sign-up page. 5. Create a AllClear ID ID. This will be your AllClear ID login ID and cannot be changed, so think of one that is secure and easy to remember. 6. Create a [employer] ID password. You can change your password at any time. 7. Enter your Password Reset Question and Answer. This can be used at a later time if you forget your password. 8. Enter your e-mail address. You will receive e-mail notification when new information is available in [address] 9. Click Sign Up. You can now view and download portions of your medical record. 10. Click the Download Summary menu link to download a portable copy of your medical information. If you have questions, please visit the Frequently Asked Questions section of the AllClear ID website. Remember, AllClear ID is NOT to be used for urgent needs. For medical emergencies, dial 911. Now available from your iPhone and Android! Please provide this summary of care documentation to your next provider. Your primary care clinician is listed as Renard Lawton. If you have any questions after today's visit, please call [phone].

## 2018-01-10 NOTE — TELEPHONE ENCOUNTER
Patient was seen today and prescribed medication that her insurance doesn't cover, Patient was advised to call her PCP to try and do a prior authorization for it at [phone] with [facility] [employer]. Patient is requesting a call back to confirm her message when [place] or [place] is available to call her back.

## 2018-01-15 ENCOUNTER — TELEPHONE (OUTPATIENT)
Dept: FAMILY MEDICINE CLINIC | Age: 48
End: 2018-01-15

## 2018-01-15 NOTE — TELEPHONE ENCOUNTER
Conrad Hoffmann from [address], want to know which records you need, patient has seen Rhuematology, obgyn, enterology. Med release form states 6 office visit.  Conrad Hoffmann is calling to verify

## 2018-01-15 NOTE — TELEPHONE ENCOUNTER
Want to know if preauthorizations was called in to make sure her insurance covers what she needs for her knee

## 2018-01-16 NOTE — TELEPHONE ENCOUNTER
Return call made and spoke to Darrius. Made aware of what was needed. Will send info as soon as she can.

## 2018-01-18 NOTE — TELEPHONE ENCOUNTER
1/16/18  11:02 AM    Chief Complaint   Patient presents with    Medication Problem       Attempted to call [employer] at this time with number patient provided to get PA information. No forms have been noted. On hold for over 5 mins at this time, disconnected call. Will try again later.

## 2018-01-18 NOTE — TELEPHONE ENCOUNTER
1/18/2018  5:48 PM    Chief Complaint   Patient presents with    Medication Problem       Attempted [facility] again at this time. Representative states that a Formulary Coverage Review will need to be completed via CoverMyMeds. Key given: [id number]. Will get this completed soon.

## 2018-01-19 NOTE — TELEPHONE ENCOUNTER
1/19/2018  5:18 PM    Chief Complaint   Patient presents with    Medication Problem       Submitted Formulary Coverage Review request through CoverMyMeds. Awaiting determination. Appears that she may need to trial BENZPHETAMINE HCL, DIETHYLPROPION HCL , or PHENTERMINE HCL before Qsymia will be covered. I am hesitant to prescribe these as these are only short term agents.

## 2018-01-23 NOTE — TELEPHONE ENCOUNTER
1/23/2018  11:34 AM    Chief Complaint   Patient presents with    Medication Problem       Patient called back to office. She is in agreement with plan to do Phentermine trial in similar dosing range as the Qsymia she was taking. Rx printed for Phentermine 8 mg daily. Patient aware that she can come  in office at her convenience. Made aware of SE and predicted effectiveness.

## 2018-01-23 NOTE — TELEPHONE ENCOUNTER
1/23/2018  11:21 AM    Chief Complaint   Patient presents with    Medication Problem       Noted denial for Formulary Coverage Exception on Qsymia. Attempted to contact patient at this time related to further management. LM requesting call back. Will plan to do Phentermine 8 mg daily since insurance appears to prefer this medication. She will be advised that this is not recommended for long term use but the dosing is comparable to the Qsymia, which is approved for use for a few years.

## 2018-04-18 ENCOUNTER — OFFICE VISIT (OUTPATIENT)
Dept: FAMILY MEDICINE CLINIC | Age: 48
End: 2018-04-18

## 2018-04-18 VITALS
BODY MASS INDEX: 33.15 KG/M2 | OXYGEN SATURATION: 99 % | TEMPERATURE: 97.7 F | WEIGHT: 199 LBS | HEART RATE: 91 BPM | RESPIRATION RATE: 20 BRPM | HEIGHT: 65 IN | DIASTOLIC BLOOD PRESSURE: 86 MMHG | SYSTOLIC BLOOD PRESSURE: 133 MMHG

## 2018-04-18 DIAGNOSIS — J01.00 ACUTE NON-RECURRENT MAXILLARY SINUSITIS: Primary | ICD-10-CM

## 2018-04-18 RX ORDER — AMOXICILLIN AND CLAVULANATE POTASSIUM 875; 125 MG/1; MG/1
1 TABLET, FILM COATED ORAL 2 TIMES DAILY
Qty: 20 TAB | Refills: 0 | Status: SHIPPED | OUTPATIENT
Start: 2018-04-18 | End: 2018-05-29 | Stop reason: SDUPTHER

## 2018-04-18 NOTE — PROGRESS NOTES
OFFICE NOTE    Avinash Gonzalez is a 49 y.o. female presenting today for office visit. 4/18/2018  8:49 AM      Chief Complaint   Patient presents with    Sinus Pain     pt here with c/o sinus pain has noted since yesterday         HPI: Here today for complaints of right sided sinus pain and pressure that has been happening since yesterday night. She reports that she has been having some postnasal drip, nasal congestion, and ear fullness for the last week-attributed to allergies and has been taking Zyrtec daily. Last night the pain in the right cheek  began to occur and she has been taking Advil and nasal sprays in addition to her Zyrtec-has not been helping. Denies any dental pain and reports an upcoming dentist appointment on Monday for routine care. Has not been running any fevers that she knows of. Review of Systems   Constitutional: Negative for chills, fatigue and fever. HENT: Positive for congestion, facial swelling, postnasal drip, rhinorrhea, sinus pain and sinus pressure. Negative for ear pain, sneezing and sore throat. Eyes: Negative for pain, discharge, itching and visual disturbance. Respiratory: Negative for cough, shortness of breath and wheezing. Cardiovascular: Negative for chest pain. Gastrointestinal: Negative for abdominal pain, diarrhea, nausea and vomiting. Musculoskeletal: Negative for arthralgias and myalgias. Skin: Negative for rash. Allergic/Immunologic: Negative for environmental allergies. Neurological: Negative for headaches.          PHQ Screening   PHQ over the last two weeks 4/18/2018   Little interest or pleasure in doing things Not at all   Feeling down, depressed or hopeless Not at all   Total Score PHQ 2 0         History  Past Medical History:   Diagnosis Date    Acne     Arthritis of knee     patient reported    GERD (gastroesophageal reflux disease)     Headache     Situational anxiety        Past Surgical History:   Procedure Laterality Date    HX HEENT      EYE SURGERY AS A CHILD       Social History     Social History    Marital status:      Spouse name: N/A    Number of children: N/A    Years of education: N/A     Occupational History    Not on file. Social History Main Topics    Smoking status: Never Smoker    Smokeless tobacco: Never Used    Alcohol use No    Drug use: No    Sexual activity: Yes     Other Topics Concern    Not on file     Social History Narrative       Allergies   Allergen Reactions    Latex Hives    Tramadol Hives       Current Outpatient Prescriptions   Medication Sig Dispense Refill    phentermine 8 mg tab Take 1 Tab by mouth daily. Max Daily Amount: 1 Tab. 30 Tab 2    ondansetron (ZOFRAN ODT) 4 mg disintegrating tablet Take 1 Tab by mouth every eight (8) hours as needed for Nausea. 15 Tab 0    raNITIdine (ZANTAC) 150 mg tablet Take 1 Tab by mouth every twelve (12) hours as needed for Indigestion. 60 Tab 11    celecoxib (CELEBREX) 200 mg capsule Take 1 Cap by mouth two (2) times a day. 60 Cap 6    spironolactone (ALDACTONE) 50 mg tablet Take 1 Tab by mouth three (3) times daily. 90 Tab 6    omeprazole (PRILOSEC) 40 mg capsule Take 1 Cap by mouth daily. 30 Cap 6    LORazepam (ATIVAN) 1 mg tablet Take 1 Tab by mouth every twelve (12) hours as needed for Anxiety. Max Daily Amount: 2 mg. 30 Tab 0    butalbital-acetaminophen-caffeine (FIORICET, ESGIC) -40 mg per tablet Take 1 Tab by mouth every twelve (12) hours as needed for Headache. Max Daily Amount: 2 Tabs. 30 Tab 2         Patient Care Team:  Patient Care Team:  Etelvina Young NP as PCP - General (Nurse Practitioner)        LABS:  None new to review    RADIOLOGY:  None new to review      Physical Exam   Constitutional: She is oriented to person, place, and time. She appears well-developed and well-nourished. No distress. HENT:   Right Ear: External ear and ear canal normal. Tympanic membrane is not injected and not erythematous.  A middle ear effusion is present. Left Ear: External ear and ear canal normal.   Nose: Mucosal edema present. No rhinorrhea. Right sinus exhibits maxillary sinus tenderness. Right sinus exhibits no frontal sinus tenderness. Left sinus exhibits no maxillary sinus tenderness and no frontal sinus tenderness. Mouth/Throat: Uvula is midline, oropharynx is clear and moist and mucous membranes are normal. No oropharyngeal exudate or posterior oropharyngeal erythema. Eyes: EOM are normal. Pupils are equal, round, and reactive to light. Right eye exhibits no discharge. Left eye exhibits no discharge. Neck: Normal range of motion. Neck supple. Cardiovascular: Normal rate, regular rhythm and normal heart sounds. No murmur heard. Pulmonary/Chest: Effort normal and breath sounds normal. No respiratory distress. Abdominal: Soft. Bowel sounds are normal. There is no tenderness. Lymphadenopathy:     She has no cervical adenopathy. Neurological: She is alert and oriented to person, place, and time. Skin: Skin is warm and dry. No rash noted. Vitals:    04/18/18 0837   BP: 133/86   Pulse: 91   Resp: 20   Temp: 97.7 °F (36.5 °C)   TempSrc: Oral   SpO2: 99%   Weight: 199 lb (90.3 kg)   Height: 5' 5\" (1.651 m)   PainSc:   8   PainLoc: Face   LMP: 04/10/2018         Assessment and Plan    Acute non-recurrent maxillary sinusitis  *Will start on AB therapy due to sinus pain and persisting symptoms despite OTC management. Continue with OTC medications PRN. - amoxicillin-clavulanate (AUGMENTIN) 875-125 mg per tablet; Take 1 Tab by mouth two (2) times a day for 10 days. Dispense: 20 Tab; Refill: 0      *Plan of care reviewed with patient. Patient in agreement with plan and expresses understanding. All questions answered and patient encouraged to call or RTO if further questions or concerns. Follow-up Disposition:  Return if symptoms worsen or fail to improve.

## 2018-04-18 NOTE — PATIENT INSTRUCTIONS
Sinusitis: Care Instructions  Your Care Instructions    Sinusitis is an infection of the lining of the sinus cavities in your head. Sinusitis often follows a cold. It causes pain and pressure in your head and face. In most cases, sinusitis gets better on its own in 1 to 2 weeks. But some mild symptoms may last for several weeks. Sometimes antibiotics are needed. Follow-up care is a key part of your treatment and safety. Be sure to make and go to all appointments, and call your doctor if you are having problems. It's also a good idea to know your test results and keep a list of the medicines you take. How can you care for yourself at home? · Take an over-the-counter pain medicine, such as acetaminophen (Tylenol), ibuprofen (Advil, Motrin), or naproxen (Aleve). Read and follow all instructions on the label. · If the doctor prescribed antibiotics, take them as directed. Do not stop taking them just because you feel better. You need to take the full course of antibiotics. · Be careful when taking over-the-counter cold or flu medicines and Tylenol at the same time. Many of these medicines have acetaminophen, which is Tylenol. Read the labels to make sure that you are not taking more than the recommended dose. Too much acetaminophen (Tylenol) can be harmful. · Breathe warm, moist air from a steamy shower, a hot bath, or a sink filled with hot water. Avoid cold, dry air. Using a humidifier in your home may help. Follow the directions for cleaning the machine. · Use saline (saltwater) nasal washes to help keep your nasal passages open and wash out mucus and bacteria. You can buy saline nose drops at a grocery store or drugstore. Or you can make your own at home by adding 1 teaspoon of salt and 1 teaspoon of baking soda to 2 cups of distilled water. If you make your own, fill a bulb syringe with the solution, insert the tip into your nostril, and squeeze gently. [place] Shy your nose.   · Put a hot, wet towel or a warm gel pack on your face 3 or 4 times a day for 5 to 10 minutes each time. · Try a decongestant nasal spray like oxymetazoline (Afrin). Do not use it for more than 3 days in a row. Using it for more than 3 days can make your congestion worse. When should you call for help? Call your doctor now or seek immediate medical care if:  ? · You have new or worse swelling or redness in your face or around your eyes. ? · You have a new or higher fever. ? Watch closely for changes in your health, and be sure to contact your doctor if:  ? · You have new or worse facial pain. ? · The mucus from your nose becomes thicker (like pus) or has new blood in it. ? · You are not getting better as expected. Where can you learn more? Go to http://meme-chai.info/. Enter Z216 in the search box to learn more about \"Sinusitis: Care Instructions. \"  Current as of: May 12, 2017  Content Version: 11.4  © 6222-6602 [employer], [employer]. Care instructions adapted under license by [employer] (which disclaims liability or warranty for this information). If you have questions about a medical condition or this instruction, always ask your healthcare professional. [address] any warranty or liability for your use of this information.

## 2018-04-18 NOTE — MR AVS SNAPSHOT
[facility] [address] [facility] 
[phone] Patient: Solomon Rosado MRN: [id number] :1970 Visit Information Date & Time Provider Department Dept. Phone Encounter #  
 2018  8:30 AM KENISHA Najera [phone] [id number] Follow-up Instructions Return if symptoms worsen or fail to improve. Upcoming Health Maintenance Date Due DTaP/Tdap/Td series (1 - Tdap) 1990 BREAST CANCER SCRN MAMMOGRAM 2018 PAP AKA CERVICAL CYTOLOGY 1/10/2023 Allergies as of 2018  Review Complete On: 2018 By: Corey Vora LPN Severity Noted Reaction Type Reactions Latex High 2017   Systemic Hives Tramadol High 2017   Systemic Hives Current Immunizations  Never Reviewed Name Date Influenza Vaccine 2017, 10/2/2009 12:00 AM  
 TB Skin Test (PPD) 10/2/2009 12:00 AM  
  
 Not reviewed this visit You Were Diagnosed With   
  
 Codes Comments Acute non-recurrent maxillary sinusitis    -  Primary ICD-10-CM: J01.00 ICD-9-CM: 461.0 Vitals BP Pulse Temp Resp Height(growth percentile) Weight(growth percentile) 133/86 (BP 1 Location: Left arm, BP Patient Position: Sitting) 91 97.7 °F (36.5 °C) (Oral) 20 5' 5\" (1.651 m) 199 lb (90.3 kg) LMP SpO2 BMI OB Status Smoking Status 04/10/2018 99% 33.12 kg/m2 Unknown Never Smoker Vitals History BMI and BSA Data Body Mass Index Body Surface Area  
 33.12 kg/m 2 2.04 m 2 Preferred Pharmacy Pharmacy Name Phone [facility] [address] [place] [address] AT [facility] [phone] Your Updated Medication List  
  
   
This list is accurate as of 18  8:51 AM.  Always use your most recent med list.  
  
  
  
  
 amoxicillin-clavulanate 875-125 mg per tablet Commonly known as:  AUGMENTIN Take 1 Tab by mouth two (2) times a day for 10 days. butalbital-acetaminophen-caffeine -40 mg per tablet Commonly known as:  Vinicius Aakash Take 1 Tab by mouth every twelve (12) hours as needed for Headache. Max Daily Amount: 2 Tabs. celecoxib 200 mg capsule Commonly known as:  CeleBREX Take 1 Cap by mouth two (2) times a day. LORazepam 1 mg tablet Commonly known as:  ATIVAN Take 1 Tab by mouth every twelve (12) hours as needed for Anxiety. Max Daily Amount: 2 mg.  
  
 omeprazole 40 mg capsule Commonly known as:  PRILOSEC Take 1 Cap by mouth daily. ondansetron 4 mg disintegrating tablet Commonly known as:  ZOFRAN ODT Take 1 Tab by mouth every eight (8) hours as needed for Nausea. phentermine 8 mg Tab Take 1 Tab by mouth daily. Max Daily Amount: 1 Tab.  
  
 raNITIdine 150 mg tablet Commonly known as:  ZANTAC Take 1 Tab by mouth every twelve (12) hours as needed for Indigestion. spironolactone 50 mg tablet Commonly known as:  ALDACTONE Take 1 Tab by mouth three (3) times daily. Prescriptions Sent to Pharmacy Refills  
 amoxicillin-clavulanate (AUGMENTIN) 875-125 mg per tablet 0 Sig: Take 1 Tab by mouth two (2) times a day for 10 days. Class: Normal  
 Pharmacy: [facility] [address] [place] [address] [address] #: [phone] Route: Oral  
  
Follow-up Instructions Return if symptoms worsen or fail to improve. Patient Instructions Sinusitis: Care Instructions Your Care Instructions Sinusitis is an infection of the lining of the sinus cavities in your head. Sinusitis often follows a cold. It causes pain and pressure in your head and face. In most cases, sinusitis gets better on its own in 1 to 2 weeks. But some mild symptoms may last for several weeks. Sometimes antibiotics are needed. Follow-up care is a key part of your treatment and safety. Be sure to make and go to all appointments, and call your doctor if you are having problems. It's also a good idea to know your test results and keep a list of the medicines you take. How can you care for yourself at home? · Take an over-the-counter pain medicine, such as acetaminophen (Tylenol), ibuprofen (Advil, Motrin), or naproxen (Aleve). Read and follow all instructions on the label. · If the doctor prescribed antibiotics, take them as directed. Do not stop taking them just because you feel better. You need to take the full course of antibiotics. · Be careful when taking over-the-counter cold or flu medicines and Tylenol at the same time. Many of these medicines have acetaminophen, which is Tylenol. Read the labels to make sure that you are not taking more than the recommended dose. Too much acetaminophen (Tylenol) can be harmful. · Breathe warm, moist air from a steamy shower, a hot bath, or a sink filled with hot water. Avoid cold, dry air. Using a humidifier in your home may help. Follow the directions for cleaning the machine. · Use saline (saltwater) nasal washes to help keep your nasal passages open and wash out mucus and bacteria. You can buy saline nose drops at a grocery store or drugstore. Or you can make your own at home by adding 1 teaspoon of salt and 1 teaspoon of baking soda to 2 cups of distilled water. If you make your own, fill a bulb syringe with the solution, insert the tip into your nostril, and squeeze gently. Jonna Courts your nose. · Put a hot, wet towel or a warm gel pack on your face 3 or 4 times a day for 5 to 10 minutes each time. · Try a decongestant nasal spray like oxymetazoline (Afrin). Do not use it for more than 3 days in a row. Using it for more than 3 days can make your congestion worse. When should you call for help? Call your doctor now or seek immediate medical care if: ? · You have new or worse swelling or redness in your face or around your eyes. ? · You have a new or higher fever. ? Watch closely for changes in your health, and be sure to contact your doctor if: 
? · You have new or worse facial pain. ? · The mucus from your nose becomes thicker (like pus) or has new blood in it. ? · You are not getting better as expected. Where can you learn more? Go to http://meme-chai.info/. Enter O889 in the search box to learn more about \"Sinusitis: Care Instructions. \" Current as of: May 12, 2017 Content Version: 11.4 © 4658-7360 [employer]. Care instructions adapted under license by [employer] (which disclaims liability or warranty for this information). If you have questions about a medical condition or this instruction, always ask your healthcare professional. [place] [address] any warranty or liability for your use of this information. Introducing [facility]! Henry Gardner introduces Minbox patient portal. Now you can access parts of your medical record, email your doctor's office, and request medication refills online. 1. In your internet browser, go to [email or web address] 2. Click on the First Time User? Click Here link in the Sign In box. You will see the New Member Sign Up page. 3. Enter your Minbox Access Code exactly as it appears below. You will not need to use this code after youve completed the sign-up process. If you do not sign up before the expiration date, you must request a new code. · Minbox Access Code: [email or web address] Expires: 7/17/2018  8:51 AM 
 
4. Enter the last four digits of your Social Security Number (xxxx) and Date of Birth (mm/dd/yyyy) as indicated and click Submit. You will be taken to the next sign-up page. 5. Create a Minbox ID.  This will be your Minbox login ID and cannot be changed, so think of one that is secure and easy to remember. 6. Create a [employer] password. You can change your password at any time. 7. Enter your Password Reset Question and Answer. This can be used at a later time if you forget your password. 8. Enter your e-mail address. You will receive e-mail notification when new information is available in [address] 9. Click Sign Up. You can now view and download portions of your medical record. 10. Click the Download Summary menu link to download a portable copy of your medical information. If you have questions, please visit the Frequently Asked Questions section of the [employer] website. Remember, [employer] is NOT to be used for urgent needs. For medical emergencies, dial 911. Now available from your iPhone and Android! Please provide this summary of care documentation to your next provider. Your primary care clinician is listed as Blank Montes. If you have any questions after today's visit, please call [phone].

## 2018-05-02 ENCOUNTER — TELEPHONE (OUTPATIENT)
Dept: FAMILY MEDICINE CLINIC | Age: 48
End: 2018-05-02

## 2018-05-02 DIAGNOSIS — B37.31 CANDIDA VAGINITIS: Primary | ICD-10-CM

## 2018-05-08 NOTE — TELEPHONE ENCOUNTER
5/8/2018  8:15 AM    Chief Complaint   Patient presents with    Medication Refill       Noted pharmacy request related to Ventolin inhaler. Patient without any medical history requiring inhaler therapy. Need more information.

## 2018-05-08 NOTE — TELEPHONE ENCOUNTER
Return call made to pt and pt states she is not on this medication. Pt at this time requesting medication for yeast infection, has finished the 10 day antibiotic treatment.

## 2018-05-10 RX ORDER — FLUCONAZOLE 150 MG/1
150 TABLET ORAL DAILY
Qty: 1 TAB | Refills: 0 | Status: SHIPPED | OUTPATIENT
Start: 2018-05-10 | End: 2018-05-29 | Stop reason: SDUPTHER

## 2018-05-10 NOTE — TELEPHONE ENCOUNTER
5/10/2018  8:48 AM    Chief Complaint   Patient presents with    Medication Refill       Noted that patient does not need refill on Ventolin- not an active medication. Noted yeast infection reports after AB completion- sent in Rx for Diflucan at this time.

## 2018-05-29 ENCOUNTER — OFFICE VISIT (OUTPATIENT)
Dept: FAMILY MEDICINE CLINIC | Age: 48
End: 2018-05-29

## 2018-05-29 VITALS
BODY MASS INDEX: 33.49 KG/M2 | RESPIRATION RATE: 18 BRPM | SYSTOLIC BLOOD PRESSURE: 133 MMHG | DIASTOLIC BLOOD PRESSURE: 85 MMHG | WEIGHT: 201 LBS | TEMPERATURE: 97.1 F | HEART RATE: 78 BPM | OXYGEN SATURATION: 98 % | HEIGHT: 65 IN

## 2018-05-29 DIAGNOSIS — E66.9 OBESITY (BMI 30-39.9): ICD-10-CM

## 2018-05-29 DIAGNOSIS — J01.00 ACUTE NON-RECURRENT MAXILLARY SINUSITIS: Primary | ICD-10-CM

## 2018-05-29 DIAGNOSIS — M19.90 ARTHRITIS: ICD-10-CM

## 2018-05-29 RX ORDER — AMOXICILLIN AND CLAVULANATE POTASSIUM 875; 125 MG/1; MG/1
1 TABLET, FILM COATED ORAL 2 TIMES DAILY
Qty: 20 TAB | Refills: 0 | Status: SHIPPED | OUTPATIENT
Start: 2018-05-29 | End: 2018-06-08

## 2018-05-29 RX ORDER — CELECOXIB 200 MG/1
200 CAPSULE ORAL 2 TIMES DAILY
Qty: 60 CAP | Refills: 6 | Status: SHIPPED | OUTPATIENT
Start: 2018-05-29 | End: 2018-07-17 | Stop reason: SDUPTHER

## 2018-05-29 RX ORDER — FLUTICASONE PROPIONATE 50 MCG
2 SPRAY, SUSPENSION (ML) NASAL DAILY
Qty: 1 BOTTLE | Refills: 11 | Status: SHIPPED | OUTPATIENT
Start: 2018-05-29 | End: 2018-11-28 | Stop reason: SDUPTHER

## 2018-05-29 RX ORDER — FLUCONAZOLE 150 MG/1
150 TABLET ORAL DAILY
Qty: 1 TAB | Refills: 0 | Status: SHIPPED | OUTPATIENT
Start: 2018-05-29 | End: 2018-05-30

## 2018-05-29 NOTE — PROGRESS NOTES
Chief Complaint   Patient presents with    Ear Pain    Sinus Pain     1. Have you been to the ER, urgent care clinic since your last visit? Hospitalized since your last visit? No    2. Have you seen or consulted any other health care providers outside of the [facility] since your last visit? Include any pap smears or colon screening.  No

## 2018-05-29 NOTE — PROGRESS NOTES
OFFICE NOTE    Avinash Gonzalez is a 49 y.o. female presenting today for office visit. 5/29/2018  12:15 PM      Chief Complaint   Patient presents with    Ear Pain    Sinus Pain         HPI: Here today for complaints of sinus pressure, sinus pain, feelings of dental pain, right ear pain, and congestion that has been happening since Saturday. Patient reports that symptoms started after mowing grass and have been continually worsening. She denies any fevers or chills she is aware of. Has tried Tylenol, Advil, and Zyrtec-did not seem to help much with symptoms. last on antibiotic about a month ago. Also requests prescription for brand name Celebrex due to having a discount card. And also would like to discuss weight management-has been taking phentermine 8 mg daily for several months and has not noticed a difference in her weight. Does admit to not counting calories. Does report walking for exercise. Does not follow any specific diet. Review of Systems   Constitutional: Negative for chills, fatigue and fever. HENT: Positive for congestion, ear pain, sinus pain and sinus pressure. Negative for facial swelling, rhinorrhea, sneezing and sore throat. Eyes: Negative for pain, discharge, itching and visual disturbance. Respiratory: Negative for cough, shortness of breath and wheezing. Cardiovascular: Negative for chest pain. Gastrointestinal: Negative for abdominal pain, diarrhea, nausea and vomiting. Musculoskeletal: Negative for arthralgias and myalgias. Skin: Negative for rash. Allergic/Immunologic: Negative for environmental allergies. Neurological: Negative for headaches.          PHQ Screening   PHQ over the last two weeks 5/29/2018   Little interest or pleasure in doing things Not at all   Feeling down, depressed or hopeless Not at all   Total Score PHQ 2 0         History  Past Medical History:   Diagnosis Date    Acne     Arthritis of knee     patient reported    GERD (gastroesophageal reflux disease)     Headache     Situational anxiety        Past Surgical History:   Procedure Laterality Date    HX HEENT      EYE SURGERY AS A CHILD       Social History     Social History    Marital status:      Spouse name: N/A    Number of children: N/A    Years of education: N/A     Occupational History    Not on file. Social History Main Topics    Smoking status: Never Smoker    Smokeless tobacco: Never Used    Alcohol use No    Drug use: No    Sexual activity: Yes     Other Topics Concern    Not on file     Social History Narrative       Allergies   Allergen Reactions    Latex Hives    Tramadol Hives       Current Outpatient Prescriptions   Medication Sig Dispense Refill    phentermine 8 mg tab Take 1 Tab by mouth daily. Max Daily Amount: 1 Tab. 30 Tab 2    ondansetron (ZOFRAN ODT) 4 mg disintegrating tablet Take 1 Tab by mouth every eight (8) hours as needed for Nausea. 15 Tab 0    raNITIdine (ZANTAC) 150 mg tablet Take 1 Tab by mouth every twelve (12) hours as needed for Indigestion. 60 Tab 11    celecoxib (CELEBREX) 200 mg capsule Take 1 Cap by mouth two (2) times a day. 60 Cap 6    spironolactone (ALDACTONE) 50 mg tablet Take 1 Tab by mouth three (3) times daily. 90 Tab 6    omeprazole (PRILOSEC) 40 mg capsule Take 1 Cap by mouth daily. 30 Cap 6    LORazepam (ATIVAN) 1 mg tablet Take 1 Tab by mouth every twelve (12) hours as needed for Anxiety. Max Daily Amount: 2 mg. 30 Tab 0    butalbital-acetaminophen-caffeine (FIORICET, ESGIC) -40 mg per tablet Take 1 Tab by mouth every twelve (12) hours as needed for Headache. Max Daily Amount: 2 Tabs. 30 Tab 2         Patient Care Team:  Patient Care Team:  Thelma Padilla NP as PCP - General (Nurse Practitioner)        LABS:  None new to review    RADIOLOGY:  None new to review        Physical Exam   Constitutional: She is oriented to person, place, and time. She appears well-developed and well-nourished. No distress. HENT:   Right Ear: Tympanic membrane, external ear and ear canal normal.   Left Ear: Tympanic membrane, external ear and ear canal normal.   Nose: Mucosal edema and rhinorrhea present. Right sinus exhibits maxillary sinus tenderness. Right sinus exhibits no frontal sinus tenderness. Left sinus exhibits no maxillary sinus tenderness and no frontal sinus tenderness. Mouth/Throat: Uvula is midline, oropharynx is clear and moist and mucous membranes are normal. No oropharyngeal exudate or posterior oropharyngeal erythema. Eyes: EOM are normal. Pupils are equal, round, and reactive to light. Right eye exhibits no discharge. Left eye exhibits no discharge. Neck: Normal range of motion. Neck supple. Cardiovascular: Normal rate, regular rhythm and normal heart sounds. No murmur heard. Pulmonary/Chest: Effort normal and breath sounds normal. No respiratory distress. Abdominal: Soft. Bowel sounds are normal. There is no tenderness. Lymphadenopathy:     She has no cervical adenopathy. Neurological: She is alert and oriented to person, place, and time. Skin: Skin is warm and dry. No rash noted. Vitals:    05/29/18 1154 05/29/18 1158   BP: (!) 139/92 133/85   Pulse: 74 78   Resp: 18    Temp: 97.1 °F (36.2 °C)    TempSrc: Oral    SpO2: 98%    Weight: 201 lb (91.2 kg)    Height: 5' 5\" (1.651 m)    PainSc:  10 - Worst pain ever    PainLoc: Ear    LMP: 05/15/2018     Wt Readings from Last 3 Encounters:   05/29/18 201 lb (91.2 kg)   04/18/18 199 lb (90.3 kg)   01/10/18 202 lb (91.6 kg)       Assessment and Plan    Acute non-recurrent maxillary sinusitis  *Discussed with patient. Recommend further symptom management for the next few days to see if improves- start on Flonase nasal spray. Advised on avoidance of allergen. Start on AB after a few days if not improving, symptoms worsen, or new symptoms appear such as fever.   - amoxicillin-clavulanate (AUGMENTIN) 875-125 mg per tablet;  Take 1 Tab by mouth two (2) times a day for 10 days. Dispense: 20 Tab; Refill: 0  - fluticasone (FLONASE) 50 mcg/actuation nasal spray; 2 Sprays by Both Nostrils route daily. Dispense: 1 Bottle; Refill: 11    Arthritis  *Refilled BRITTNI on Rx.   - CELEBREX 200 mg capsule; Take 1 Cap by mouth two (2) times a day. Dispense: 60 Cap; Refill: 6    Obesity (BMI 30-39.9)  *Discussed with patient. Recommend referral to nutritionist. Will continue on Phentermine at this time, but I have explained to patient that it must be used in conjunction with a proper diet and exercise.   - REFERRAL TO NUTRITION  - phentermine 8 mg tab; Take 1 Tab by mouth daily. Max Daily Amount: 1 Tab. Dispense: 30 Tab; Refill: 2        *Plan of care reviewed with patient. Patient in agreement with plan and expresses understanding. All questions answered and patient encouraged to call or RTO if further questions or concerns. Follow-up Disposition:  Return if symptoms worsen or fail to improve.

## 2018-05-29 NOTE — PATIENT INSTRUCTIONS
Learning About Cutting Calories  How do calories affect your weight? Food gives your body energy. Energy from the food you eat is measured in calories. This energy keeps your heart beating, your brain active, and your muscles working. Your body needs a certain number of calories each day. After your body uses the calories it needs, it stores extra calories as fat. To lose weight safely, you have to eat fewer calories while eating in a healthy way. How many calories do you need each day? The more active you are, the more calories you need. When you are less active, you need fewer calories. How many calories you need each day also depends on several things, including your age and whether you are male or female. Here are some general guidelines for adults:  · Less active women and older adults need 1,600 to 2,000 calories each day. · Active women and less active men need 2,000 to 2,400 calories each day. · Active men need 2,400 to 3,000 calories each day. How can you cut calories and eat healthy meals? Whole grains, vegetables and fruits, and dried beans are good lower-calorie foods. They give you lots of nutrients and fiber. And they fill you up. Sweets, energy drinks, and soda pop are high in calories. They give you few nutrients and no fiber. Try to limit soda pop, fruit juice, and energy drinks. Drink water instead. Some fats can be part of a healthy diet. But cutting back on fats from highly processed foods like fast foods and many snack foods is a good way to lower the calories in your diet. Also, use smaller amounts of fats like butter, margarine, salad dressing, and mayonnaise. Add fresh garlic, lemon, or herbs to your meals to add flavor without adding fat. Meats and dairy products can be a big source of hidden fats. Try to choose lean or low-fat versions of these products. Fat-free cookies, candies, chips, and frozen treats can still be high in sugar and calories.  Some fat-free foods have more calories than regular ones. Eat fat-free treats in moderation, as you would other foods. If your favorite foods are high in fat, salt, sugar, or calories, limit how often you eat them. Eat smaller servings, or look for healthy substitutes. Fill up on fruits, vegetables, and whole grains. Eating at home  · Use meat as a side dish instead of as the main part of your meal.  · Try main dishes that use whole wheat pasta, brown rice, dried beans, or vegetables. · Find ways to cook with little or no fat, such as broiling, steaming, or grilling. · Use cooking spray instead of oil. If you use oil, use a monounsaturated oil, such as canola or olive oil. · Trim fat from meats before you cook them. · Drain off fat after you brown the meat or while you roast it. · Chill soups and stews after you cook them. Then skim the fat off the top after it hardens. Eating out  · Order foods that are broiled or poached rather than fried or breaded. · Cut back on the amount of butter or margarine that you use on bread. · Order sauces, gravies, and salad dressings on the side, and use only a little. · When you order pasta, choose tomato sauce rather than cream sauce. · Ask for salsa with your baked potato instead of sour cream, butter, cheese, or bailey. · Order meals in a small size instead of upgrading to a large. · Share an entree, or take part of your food home to eat as another meal.  · Share appetizers and desserts. Where can you learn more? Go to http://meme-chai.info/. Enter 99 [id number] in the search box to learn more about \"Learning About Cutting Calories. \"  Current as of: May 12, 2017  Content Version: 11.4  © 8443-5961 [employer], [employer]. Care instructions adapted under license by [employer] (which disclaims liability or warranty for this information).  If you have questions about a medical condition or this instruction, always ask your healthcare professional. Fatou [facility] disclaims any warranty or liability for your use of this information. Allergies: Care Instructions  Your Care Instructions    Allergies occur when your body's defense system (immune system) overreacts to certain substances. The immune system treats a harmless substance as if it were a harmful germ or virus. Many things can cause this overreaction, including pollens, medicine, food, dust, animal dander, and mold. Allergies can be mild or severe. Mild allergies can be managed with home treatment. But medicine may be needed to prevent problems. Managing your allergies is an important part of staying healthy. Your doctor may suggest that you have allergy testing to help find out what is causing your allergies. When you know what things trigger your symptoms, you can avoid them. This can prevent allergy symptoms and other health problems. For severe allergies that cause reactions that affect your whole body (anaphylactic reactions), your doctor may prescribe a shot of epinephrine to carry with you in case you have a severe reaction. Learn how to give yourself the shot and keep it with you at all times. Make sure it is not . Follow-up care is a key part of your treatment and safety. Be sure to make and go to all appointments, and call your doctor if you are having problems. It's also a good idea to know your test results and keep a list of the medicines you take. How can you care for yourself at home? · If you have been told by your doctor that dust or dust mites are causing your allergy, decrease the dust around your bed:  [facility] sheets, pillowcases, and other bedding in hot water every week. ¨ Use dust-proof covers for pillows, duvets, and mattresses. Avoid plastic covers because they tear easily and do not \"breathe. \" Wash as instructed on the label. ¨ Do not use any blankets and pillows that you do not need. ¨ Use blankets that you can wash in your washing machine.   ¨ Consider removing drapes and carpets, which attract and hold dust, from your bedroom. · If you are allergic to house dust and mites, do not use home humidifiers. Your doctor can suggest ways you can control dust and mites. · Look for signs of cockroaches. Cockroaches cause allergic reactions. Use cockroach baits to get rid of them. Then, clean your home well. Cockroaches like areas where grocery bags, newspapers, empty bottles, or cardboard boxes are stored. Do not keep these inside your home, and keep trash and food containers sealed. Seal off any spots where cockroaches might enter your home. · If you are allergic to mold, get rid of furniture, rugs, and drapes that smell musty. Check for mold in the bathroom. · If you are allergic to outdoor pollen or mold spores, use air-conditioning. Change or clean all filters every month. Keep windows closed. · If you are allergic to pollen, stay inside when pollen counts are high. Use a vacuum  with a HEPA filter or a double-thickness filter at least two times each week. · Stay inside when air pollution is bad. Avoid paint fumes, perfumes, and other strong odors. · Avoid conditions that make your allergies worse. Stay away from smoke. Do not smoke or let anyone else smoke in your house. Do not use fireplaces or wood-burning stoves. · If you are allergic to your pets, change the air filter in your furnace every month. Use high-efficiency filters. · If you are allergic to pet dander, keep pets outside or out of your bedroom. Old carpet and cloth furniture can hold a lot of animal dander. You may need to replace them. When should you call for help? Give an epinephrine shot if:  ? · You think you are having a severe allergic reaction. ? · You have symptoms in more than one body area, such as mild nausea and an itchy mouth. ? After giving an epinephrine shot call 911, even if you feel better. ?Call 911 if:  ? · You have symptoms of a severe allergic reaction.  These may include:  ¨ Sudden raised, red areas (hives) all over your body. ¨ Swelling of the throat, mouth, lips, or tongue. ¨ Trouble breathing. ¨ Passing out (losing consciousness). Or you may feel very lightheaded or suddenly feel weak, confused, or restless. ? · You have been given an epinephrine shot, even if you feel better. ?Call your doctor now or seek immediate medical care if:  ? · You have symptoms of an allergic reaction, such as:  ¨ A rash or hives (raised, red areas on the skin). ¨ Itching. ¨ Swelling. ¨ Belly pain, nausea, or vomiting. ? Watch closely for changes in your health, and be sure to contact your doctor if:  ? · You do not get better as expected. Where can you learn more? Go to http://meme-chai.info/. Enter K893 in the search box to learn more about \"Allergies: Care Instructions. \"  Current as of: September 29, 2016  Content Version: 11.4  © 0081-3849 [employer]. Care instructions adapted under license by [employer] (which disclaims liability or warranty for this information). If you have questions about a medical condition or this instruction, always ask your healthcare professional. [address] any warranty or liability for your use of this information.

## 2018-05-29 NOTE — MR AVS SNAPSHOT
[facility] [address] [facility] 
[phone] Patient: Jigar Flynn MRN: [id number] :[id number] Visit Information Date & Time Provider Department Dept. Phone Encounter #  
 2018 11:45 AM Benito Altman, [facility] [phone] [id number] Follow-up Instructions Return if symptoms worsen or fail to improve. Upcoming Health Maintenance Date Due DTaP/Tdap/Td series (1 - Tdap) 1990 Influenza Age 5 to Adult 2018 BREAST CANCER SCRN MAMMOGRAM 2018 PAP AKA CERVICAL CYTOLOGY 1/10/2023 Allergies as of 2018  Review Complete On: 2018 By: Benito Altman, NP Severity Noted Reaction Type Reactions Latex High 2017   Systemic Hives Tramadol High 2017   Systemic Hives Current Immunizations  Never Reviewed Name Date Influenza Vaccine 2017, 10/2/2009 12:00 AM  
 TB Skin Test (PPD) 10/2/2009 12:00 AM  
  
 Not reviewed this visit You Were Diagnosed With   
  
 Codes Comments Obesity (BMI 30-39.9)    -  Primary ICD-10-CM: B87.8 ICD-9-CM: 278.00 Arthritis     ICD-10-CM: M19.90 ICD-9-CM: 716.90 Acute non-recurrent maxillary sinusitis     ICD-10-CM: J01.00 ICD-9-CM: 461.0 Vitals BP Pulse Temp Resp Height(growth percentile) Weight(growth percentile) 133/85 (BP 1 Location: Left arm, BP Patient Position: Sitting) 78 97.1 °F (36.2 °C) (Oral) 18 5' 5\" (1.651 m) 201 lb (91.2 kg) LMP SpO2 BMI OB Status Smoking Status 05/15/2018 98% 33.45 kg/m2 Unknown Never Smoker Vitals History BMI and BSA Data Body Mass Index Body Surface Area  
 33.45 kg/m 2 2.05 m 2 Preferred Pharmacy Pharmacy Name Phone [facility] [address] [place] [address] AT [facility] [phone] Your Updated Medication List  
  
   
 This list is accurate as of 5/29/18 12:27 PM.  Always use your most recent med list.  
  
  
  
  
 amoxicillin-clavulanate 875-125 mg per tablet Commonly known as:  AUGMENTIN Take 1 Tab by mouth two (2) times a day for 10 days. butalbital-acetaminophen-caffeine -40 mg per tablet Commonly known as:  Celestine Juan Luis Take 1 Tab by mouth every twelve (12) hours as needed for Headache. Max Daily Amount: 2 Tabs. CeleBREX 200 mg capsule Generic drug:  celecoxib Take 1 Cap by mouth two (2) times a day. fluconazole 150 mg tablet Commonly known as:  DIFLUCAN Take 1 Tab by mouth daily for 1 day. FDA advises cautious prescribing of oral fluconazole in pregnancy. fluticasone 50 mcg/actuation nasal spray Commonly known as:  Donna Claudia 2 Sprays by Both Nostrils route daily. LORazepam 1 mg tablet Commonly known as:  ATIVAN Take 1 Tab by mouth every twelve (12) hours as needed for Anxiety. Max Daily Amount: 2 mg.  
  
 omeprazole 40 mg capsule Commonly known as:  PRILOSEC Take 1 Cap by mouth daily. ondansetron 4 mg disintegrating tablet Commonly known as:  ZOFRAN ODT Take 1 Tab by mouth every eight (8) hours as needed for Nausea. phentermine 8 mg Tab Take 1 Tab by mouth daily. Max Daily Amount: 1 Tab.  
  
 raNITIdine 150 mg tablet Commonly known as:  ZANTAC Take 1 Tab by mouth every twelve (12) hours as needed for Indigestion. spironolactone 50 mg tablet Commonly known as:  ALDACTONE Take 1 Tab by mouth three (3) times daily. Prescriptions Printed Refills  
 phentermine 8 mg tab 2 Sig: Take 1 Tab by mouth daily. Max Daily Amount: 1 Tab. Class: Print Route: Oral  
  
Prescriptions Sent to Pharmacy Refills CELEBREX 200 mg capsule 6 Sig: Take 1 Cap by mouth two (2) times a day.   
 Class: Normal  
 Pharmacy: [facility] [facility], [address] [place] [address] [facility] Ph #: [phone] Route: Oral  
 fluconazole (DIFLUCAN) 150 mg tablet 0 Sig: Take 1 Tab by mouth daily for 1 day. FDA advises cautious prescribing of oral fluconazole in pregnancy. Class: Normal  
 Pharmacy: [facility] [address] [place] [address] Ph #: [phone] Route: Oral  
 amoxicillin-clavulanate (AUGMENTIN) 875-125 mg per tablet 0 Sig: Take 1 Tab by mouth two (2) times a day for 10 days. Class: Normal  
 Pharmacy: [facility] [address] [place] [address] Ph #: [phone] Route: Oral  
 fluticasone (FLONASE) 50 mcg/actuation nasal spray 11 Si Sprays by Both Nostrils route daily. Class: Normal  
 Pharmacy: [facility] [employer] [place] [address] Ph #: [phone] Route: Both Nostrils We Performed the Following REFERRAL TO NUTRITION [REF50 Custom] Follow-up Instructions Return if symptoms worsen or fail to improve. Referral Information Referral ID Referred By Referred To  
  
 [id number] [facility]   
   [address] [place] [address] Phone: [phone] Visits Status Start Date End Date 1 New Request 18 If your referral has a status of pending review or denied, additional information will be sent to support the outcome of this decision. Patient Instructions Learning About Cutting Calories How do calories affect your weight? Food gives your body energy. Energy from the food you eat is measured in calories. This energy keeps your heart beating, your brain active, and your muscles working. Your body needs a certain number of calories each day. After your body uses the calories it needs, it stores extra calories as fat. To lose weight safely, you have to eat fewer calories while eating in a healthy way. How many calories do you need each day? The more active you are, the more calories you need. When you are less active, you need fewer calories. How many calories you need each day also depends on several things, including your age and whether you are male or female. Here are some general guidelines for adults: 
· Less active women and older adults need 1,600 to 2,000 calories each day. · Active women and less active men need 2,000 to 2,400 calories each day. · Active men need 2,400 to 3,000 calories each day. How can you cut calories and eat healthy meals? Whole grains, vegetables and fruits, and dried beans are good lower-calorie foods. They give you lots of nutrients and fiber. And they fill you up. Sweets, energy drinks, and soda pop are high in calories. They give you few nutrients and no fiber. Try to limit soda pop, fruit juice, and energy drinks. Drink water instead. Some fats can be part of a healthy diet. But cutting back on fats from highly processed foods like fast foods and many snack foods is a good way to lower the calories in your diet. Also, use smaller amounts of fats like butter, margarine, salad dressing, and mayonnaise. Add fresh garlic, lemon, or herbs to your meals to add flavor without adding fat. Meats and dairy products can be a big source of hidden fats. Try to choose lean or low-fat versions of these products. Fat-free cookies, candies, chips, and frozen treats can still be high in sugar and calories. Some fat-free foods have more calories than regular ones. Eat fat-free treats in moderation, as you would other foods. If your favorite foods are high in fat, salt, sugar, or calories, limit how often you eat them. Eat smaller servings, or look for healthy substitutes. Fill up on fruits, vegetables, and whole grains. Eating at home · Use meat as a side dish instead of as the main part of your meal. 
· Try main dishes that use whole wheat pasta, brown rice, dried beans, or vegetables. · Find ways to cook with little or no fat, such as broiling, steaming, or grilling. · Use cooking spray instead of oil. If you use oil, use a monounsaturated oil, such as canola or olive oil. · Trim fat from meats before you cook them. · Drain off fat after you brown the meat or while you roast it. · Chill soups and stews after you cook them. Then skim the fat off the top after it hardens. Eating out · Order foods that are broiled or poached rather than fried or breaded. · Cut back on the amount of butter or margarine that you use on bread. · Order sauces, gravies, and salad dressings on the side, and use only a little. · When you order pasta, choose tomato sauce rather than cream sauce. · Ask for salsa with your baked potato instead of sour cream, butter, cheese, or bailey. · Order meals in a small size instead of upgrading to a large. · Share an entree, or take part of your food home to eat as another meal. 
· Share appetizers and desserts. Where can you learn more? Go to http://meme-chai.info/. Enter 99 335210 in the search box to learn more about \"Learning About Cutting Calories. \" Current as of: May 12, 2017 Content Version: 11.4 © 6150-4932 [employer]. Care instructions adapted under license by [employer] (which disclaims liability or warranty for this information). If you have questions about a medical condition or this instruction, always ask your healthcare professional. [place] [address] any warranty or liability for your use of this information. Allergies: Care Instructions Your Care Instructions Allergies occur when your body's defense system (immune system) overreacts to certain substances.  The immune system treats a harmless substance as if it were a harmful germ or virus. Many things can cause this overreaction, including pollens, medicine, food, dust, animal dander, and mold. Allergies can be mild or severe. Mild allergies can be managed with home treatment. But medicine may be needed to prevent problems. Managing your allergies is an important part of staying healthy. Your doctor may suggest that you have allergy testing to help find out what is causing your allergies. When you know what things trigger your symptoms, you can avoid them. This can prevent allergy symptoms and other health problems. For severe allergies that cause reactions that affect your whole body (anaphylactic reactions), your doctor may prescribe a shot of epinephrine to carry with you in case you have a severe reaction. Learn how to give yourself the shot and keep it with you at all times. Make sure it is not . Follow-up care is a key part of your treatment and safety. Be sure to make and go to all appointments, and call your doctor if you are having problems. It's also a good idea to know your test results and keep a list of the medicines you take. How can you care for yourself at home? · If you have been told by your doctor that dust or dust mites are causing your allergy, decrease the dust around your bed: 
[facility] sheets, pillowcases, and other bedding in hot water every week. ¨ Use dust-proof covers for pillows, duvets, and mattresses. Avoid plastic covers because they tear easily and do not \"breathe. \" Wash as instructed on the label. ¨ Do not use any blankets and pillows that you do not need. ¨ Use blankets that you can wash in your washing machine. ¨ Consider removing drapes and carpets, which attract and hold dust, from your bedroom. · If you are allergic to house dust and mites, do not use home humidifiers. Your doctor can suggest ways you can control dust and mites. · Look for signs of cockroaches. Cockroaches cause allergic reactions.  Use cockroach baits to get rid of them. Then, clean your home well. Cockroaches like areas where grocery bags, newspapers, empty bottles, or cardboard boxes are stored. Do not keep these inside your home, and keep trash and food containers sealed. Seal off any spots where cockroaches might enter your home. · If you are allergic to mold, get rid of furniture, rugs, and drapes that smell musty. Check for mold in the bathroom. · If you are allergic to outdoor pollen or mold spores, use air-conditioning. Change or clean all filters every month. Keep windows closed. · If you are allergic to pollen, stay inside when pollen counts are high. Use a vacuum  with a HEPA filter or a double-thickness filter at least two times each week. · Stay inside when air pollution is bad. Avoid paint fumes, perfumes, and other strong odors. · Avoid conditions that make your allergies worse. Stay away from smoke. Do not smoke or let anyone else smoke in your house. Do not use fireplaces or wood-burning stoves. · If you are allergic to your pets, change the air filter in your furnace every month. Use high-efficiency filters. · If you are allergic to pet dander, keep pets outside or out of your bedroom. Old carpet and cloth furniture can hold a lot of animal dander. You may need to replace them. When should you call for help? Give an epinephrine shot if: 
? · You think you are having a severe allergic reaction. ? · You have symptoms in more than one body area, such as mild nausea and an itchy mouth. ? After giving an epinephrine shot call 911, even if you feel better. ?Call 911 if: 
? · You have symptoms of a severe allergic reaction. These may include: 
¨ Sudden raised, red areas (hives) all over your body. ¨ Swelling of the throat, mouth, lips, or tongue. ¨ Trouble breathing. ¨ Passing out (losing consciousness). Or you may feel very lightheaded or suddenly feel weak, confused, or restless. ? · You have been given an epinephrine shot, even if you feel better. ?Call your doctor now or seek immediate medical care if: 
? · You have symptoms of an allergic reaction, such as: ¨ A rash or hives (raised, red areas on the skin). ¨ Itching. ¨ Swelling. ¨ Belly pain, nausea, or vomiting. ? Watch closely for changes in your health, and be sure to contact your doctor if: 
? · You do not get better as expected. Where can you learn more? Go to http://meme-chai.info/. Enter J036 in the search box to learn more about \"Allergies: Care Instructions. \" Current as of: September 29, 2016 Content Version: 11.4 © 1067-8207 [employer]. Care instructions adapted under license by [employer] (which disclaims liability or warranty for this information). If you have questions about a medical condition or this instruction, always ask your healthcare professional. [place] [address] any warranty or liability for your use of this information. Introducing [facility]! Raj Godfrey introduces [employer] patient portal. Now you can access parts of your medical record, email your doctor's office, and request medication refills online. 1. In your internet browser, go to [email or web address] 2. Click on the First Time User? Click Here link in the Sign In box. You will see the New Member Sign Up page. 3. Enter your An[employer] Access Code exactly as it appears below. You will not need to use this code after youve completed the sign-up process. If you do not sign up before the expiration date, you must request a new code. · An[employer] Access Code: [email or web address] Expires: 7/17/2018  8:51 AM 
 
4. Enter the last four digits of your Social Security Number (xxxx) and Date of Birth (mm/dd/yyyy) as indicated and click Submit. You will be taken to the next sign-up page. 5. Create a [employer] ID. This will be your [employer] login ID and cannot be changed, so think of one that is secure and easy to remember. 6. Create a [employer] password. You can change your password at any time. 7. Enter your Password Reset Question and Answer. This can be used at a later time if you forget your password. 8. Enter your e-mail address. You will receive e-mail notification when new information is available in [address] 9. Click Sign Up. You can now view and download portions of your medical record. 10. Click the Download Summary menu link to download a portable copy of your medical information. If you have questions, please visit the Frequently Asked Questions section of the [employer] website. Remember, [employer] is NOT to be used for urgent needs. For medical emergencies, dial 911. Now available from your iPhone and Android! Please provide this summary of care documentation to your next provider. Your primary care clinician is listed as Aleksandra Frias. If you have any questions after today's visit, please call [phone].

## 2018-07-17 DIAGNOSIS — M19.90 ARTHRITIS: ICD-10-CM

## 2018-07-18 ENCOUNTER — DOCUMENTATION ONLY (OUTPATIENT)
Dept: FAMILY MEDICINE CLINIC | Age: 48
End: 2018-07-18

## 2018-07-18 NOTE — PROGRESS NOTES
7/18/2018  7:59 AM    Chief Complaint   Patient presents with    Prior Auth       Noted PA for Celebrex medication. Patient wanted brand name medication during last visit and rx was written BRITTNI. She had a copay card to use for the medication. PA not required for generic Celebrex. Patient will have to pay out of pocket with discount card to get brand name Celebrex.

## 2018-07-23 RX ORDER — CELECOXIB 200 MG/1
200 CAPSULE ORAL 2 TIMES DAILY
Qty: 60 CAP | Refills: 6 | Status: SHIPPED | OUTPATIENT
Start: 2018-07-23 | End: 2018-07-26 | Stop reason: SDUPTHER

## 2018-07-23 NOTE — TELEPHONE ENCOUNTER
Patient called and stated [employer] will be faxing a document stating they need authorization for the medication of Celebrex. Patient is asking that the document be responded to as soon as possible. Stated she has gotten this medication before with out authorization so she's not sure why authorization is needed. She also has a $8 coupon off for the co pay of the medication. Please contact patient when the authorization is done. Stated she was told her request would be put in last Thurs. 07/19/18 but still not done.

## 2018-07-23 NOTE — TELEPHONE ENCOUNTER
Spoke with patient at this time and patient is requesting for the generic name to be called in at this time. Patient states the medication is 30 dollars brand name and 8 dollars generic name. Please Advise. Patient is requesting for medication as soon as possible.

## 2018-07-26 RX ORDER — CELECOXIB 200 MG/1
200 CAPSULE ORAL 2 TIMES DAILY
Qty: 60 CAP | Refills: 2 | Status: SHIPPED | OUTPATIENT
Start: 2018-07-26 | End: 2018-10-24

## 2018-07-31 RX ORDER — CELECOXIB 200 MG/1
200 CAPSULE ORAL 2 TIMES DAILY
Qty: 60 CAP | Refills: 2 | OUTPATIENT
Start: 2018-07-31 | End: 2018-10-29

## 2018-07-31 NOTE — TELEPHONE ENCOUNTER
7/31/2018  6:21 PM    Chief Complaint   Patient presents with    Medication Refill       Noted continued confusion on Celebrex. It has been documented on an occasion that she wanted generic and another that she wanted brand name. Called patient at this time to clarify. She reports that she does want BRAND name Celebrex. She states that she was able to get the brand name for the first month and then since then, the pharmacy has wanted to give generic stating that her insurance required a PA. Informed patient that PA was previously completed- done again at this time for BRITTNI and CoverMyMeds reports that PA not needed as it was a covered medication. Informed patient of this. She states that she will talk again with her pharmacy. Requested that if there was still an issue to have the pharmacist call me to discuss.

## 2018-07-31 NOTE — TELEPHONE ENCOUNTER
Requested Prescriptions     Pending Prescriptions Disp Refills    celecoxib (CELEBREX) 200 mg capsule 60 Cap 2     Sig: Take 1 Cap by mouth two (2) times a day for 90 days. _Patient want the name brand not generic brand. Needs prior authorization from insurance. Patient is frustrated and complaining that this request has been entered since last Thursday.  Please contact patient when available to discuss

## 2018-08-06 NOTE — TELEPHONE ENCOUNTER
Patient came into the office stating she needs her Celebrex. I explained that the provider sent it in 7/26/18. She says the pharmacy told her it needed to have prior authorization. Patient is aware that the provider is out of the office today. She is asking if this can be straightened out ASAP because she's been without her medication.

## 2018-08-07 NOTE — TELEPHONE ENCOUNTER
8/7/2018  2:38 PM    Chief Complaint   Patient presents with    Medication Problem       Noted continued issue with PA. Called pharmacy at this time- they report continued rejection from insurance for brand Celebrex. Completed new PA at this time via CoverMyMeds. Awaiting determination.

## 2018-08-09 NOTE — TELEPHONE ENCOUNTER
8/9/2018  10:07 AM    Chief Complaint   Patient presents with    Medication Problem       PA has been denied by insurance for brand name Celebrex. Appears that patient needs to have a serious adverse or significant allergic reaction to generic Celebrex before her insurance will cover a brand name. Called and LM informing patient of this information. She can either pay out of pocket for brand name Celebrex or she can get generic Celebrex through her insurance.

## 2018-09-13 ENCOUNTER — OFFICE VISIT (OUTPATIENT)
Dept: FAMILY MEDICINE CLINIC | Age: 48
End: 2018-09-13

## 2018-09-13 VITALS
WEIGHT: 196 LBS | SYSTOLIC BLOOD PRESSURE: 139 MMHG | BODY MASS INDEX: 32.65 KG/M2 | HEIGHT: 65 IN | TEMPERATURE: 97.2 F | OXYGEN SATURATION: 97 % | RESPIRATION RATE: 20 BRPM | DIASTOLIC BLOOD PRESSURE: 88 MMHG | HEART RATE: 76 BPM

## 2018-09-13 DIAGNOSIS — R21 RASH AND NONSPECIFIC SKIN ERUPTION: Primary | ICD-10-CM

## 2018-09-13 RX ORDER — TRIAMCINOLONE ACETONIDE 1 MG/G
CREAM TOPICAL 2 TIMES DAILY
Qty: 45 G | Refills: 1 | Status: SHIPPED | OUTPATIENT
Start: 2018-09-13

## 2018-09-13 NOTE — MR AVS SNAPSHOT
[facility] 107 200 [facility] 
[phone] Patient: Hali Lehman MRN: [id number] :1970 Visit Information Date & Time Provider Department Dept. Phone Encounter #  
 2018  9:45 AM Josr Maguire [facility] [address] [phone] [id number] Follow-up Instructions Return if symptoms worsen or fail to improve. Upcoming Health Maintenance Date Due DTaP/Tdap/Td series (1 - Tdap) 1990 Influenza Age 5 to Adult 2018 PAP AKA CERVICAL CYTOLOGY 1/10/2023 Allergies as of 2018  Review Complete On: 2018 By: Michael Rodríguez NP Severity Noted Reaction Type Reactions Latex High 2017   Systemic Hives Tramadol High 2017   Systemic Hives Current Immunizations  Never Reviewed Name Date Influenza Vaccine 2017, 10/2/2009 12:00 AM  
 TB Skin Test (PPD) 10/2/2009 12:00 AM  
  
 Not reviewed this visit You Were Diagnosed With   
  
 Codes Comments Rash and nonspecific skin eruption    -  Primary ICD-10-CM: R21 
ICD-9-CM: 782.1 Vitals BP Pulse Temp Resp Height(growth percentile) Weight(growth percentile) 139/88 (BP 1 Location: Left arm, BP Patient Position: Sitting) 76 97.2 °F (36.2 °C) (Oral) 20 5' 5\" (1.651 m) 196 lb (88.9 kg) LMP SpO2 BMI OB Status Smoking Status 2018 97% 32.62 kg/m2 Unknown Never Smoker Vitals History BMI and BSA Data Body Mass Index Body Surface Area  
 32.62 kg/m 2 2.02 m 2 Preferred Pharmacy Pharmacy Name Phone [facility] [address] [place] [address], [facility] AT [facility] [phone] Your Updated Medication List  
  
   
This list is accurate as of 18 10:32 AM.  Always use your most recent med list.  
  
  
  
  
 butalbital-acetaminophen-caffeine -40 mg per tablet Commonly known as:  Lawerence Lied Take 1 Tab by mouth every twelve (12) hours as needed for Headache. Max Daily Amount: 2 Tabs. celecoxib 200 mg capsule Commonly known as:  CELEBREX Take 1 Cap by mouth two (2) times a day for 90 days. fluticasone 50 mcg/actuation nasal spray Commonly known as:  Oscar Merles 2 Sprays by Both Nostrils route daily. LORazepam 1 mg tablet Commonly known as:  ATIVAN Take 1 Tab by mouth every twelve (12) hours as needed for Anxiety. Max Daily Amount: 2 mg.  
  
 omeprazole 40 mg capsule Commonly known as:  PRILOSEC Take 1 Cap by mouth daily. ondansetron 4 mg disintegrating tablet Commonly known as:  ZOFRAN ODT Take 1 Tab by mouth every eight (8) hours as needed for Nausea. phentermine 8 mg Tab Take 1 Tab by mouth daily. Max Daily Amount: 1 Tab.  
  
 raNITIdine 150 mg tablet Commonly known as:  ZANTAC Take 1 Tab by mouth every twelve (12) hours as needed for Indigestion. spironolactone 50 mg tablet Commonly known as:  ALDACTONE Take 1 Tab by mouth three (3) times daily. triamcinolone acetonide 0.1 % topical cream  
Commonly known as:  KENALOG Apply  to affected area two (2) times a day. use thin layer Prescriptions Sent to Pharmacy Refills  
 triamcinolone acetonide (KENALOG) 0.1 % topical cream 1 Sig: Apply  to affected area two (2) times a day. use thin layer Class: Normal  
 Pharmacy: [facility] [address] [place] [address] [address]  #: [phone] Route: Topical  
  
Follow-up Instructions Return if symptoms worsen or fail to improve. Introducing [facility]! [employer] introduces [employer] patient portal. Now you can access parts of your medical record, email your doctor's office, and request medication refills online. 1. In your internet browser, go to [email or web address] 2. Click on the First Time User? Click Here link in the Sign In box. You will see the New Member Sign Up page. 3. Enter your [employer] Access Code exactly as it appears below. You will not need to use this code after youve completed the sign-up process. If you do not sign up before the expiration date, you must request a new code. · [employer] Access Code: [email or web address] Expires: 12/12/2018 10:23 AM 
 
4. Enter the last four digits of your Social Security Number (xxxx) and Date of Birth (mm/dd/yyyy) as indicated and click Submit. You will be taken to the next sign-up page. 5. Create a [employer] ID. This will be your [employer] login ID and cannot be changed, so think of one that is secure and easy to remember. 6. Create a [employer] password. You can change your password at any time. 7. Enter your Password Reset Question and Answer. This can be used at a later time if you forget your password. 8. Enter your e-mail address. You will receive e-mail notification when new information is available in [address] 9. Click Sign Up. You can now view and download portions of your medical record. 10. Click the Download Summary menu link to download a portable copy of your medical information. If you have questions, please visit the Frequently Asked Questions section of the [employer] website. Remember, [employer] is NOT to be used for urgent needs. For medical emergencies, dial 911. Now available from your iPhone and Android! Please provide this summary of care documentation to your next provider. Your primary care clinician is listed as Galielo Barksdale. If you have any questions after today's visit, please call [phone].

## 2018-09-13 NOTE — PROGRESS NOTES
OFFICE NOTE    Mynor Curry is a 51 y.o. female presenting today for office visit. 9/13/2018  9:59 AM      Chief Complaint   Patient presents with    Rash     pt here with rash to right back and side has noted for a day, some tingling and discomfort         HPI: Here today for complaints of rash that has been happening for about a day with associated tingling and discomfort in the area. Has not found any relief with any medications. No changes in soaps, detergents, etc. She is unsure of cause. She denies any contacts with rashes. Nothing seems to make better or worse. She denies any itching, just a little tingling and discomfort. She denies any ENT symptoms now or shortly before this time. No fever, fatigue, changes in appetite, etc. No recent travel. Review of Systems   Constitutional: Negative for chills, fatigue and fever. HENT: Negative for congestion, ear pain, facial swelling, postnasal drip, rhinorrhea, sinus pain, sinus pressure, sneezing and sore throat. Eyes: Negative for pain, discharge, itching and visual disturbance. Respiratory: Negative for cough, shortness of breath and wheezing. Cardiovascular: Negative for chest pain. Gastrointestinal: Negative for abdominal pain, diarrhea, nausea and vomiting. Musculoskeletal: Negative for arthralgias and myalgias. Skin: Positive for rash. Allergic/Immunologic: Negative for environmental allergies. Neurological: Negative for headaches.          PHQ Screening   PHQ over the last two weeks 9/13/2018   Little interest or pleasure in doing things -   Feeling down, depressed, irritable, or hopeless Not at all   Total Score PHQ 2 -         History  Past Medical History:   Diagnosis Date    Acne     Arthritis of knee     patient reported    GERD (gastroesophageal reflux disease)     Headache     Situational anxiety        Past Surgical History:   Procedure Laterality Date    HX HEENT      EYE SURGERY AS A CHILD       Social History     Social History    Marital status:      Spouse name: N/A    Number of children: N/A    Years of education: N/A     Occupational History    Not on file. Social History Main Topics    Smoking status: Never Smoker    Smokeless tobacco: Never Used    Alcohol use No    Drug use: No    Sexual activity: Yes     Other Topics Concern    Not on file     Social History Narrative       Allergies   Allergen Reactions    Latex Hives    Tramadol Hives       Current Outpatient Prescriptions   Medication Sig Dispense Refill    celecoxib (CELEBREX) 200 mg capsule Take 1 Cap by mouth two (2) times a day for 90 days. 60 Cap 2    fluticasone (FLONASE) 50 mcg/actuation nasal spray 2 Sprays by Both Nostrils route daily. 1 Bottle 11    ondansetron (ZOFRAN ODT) 4 mg disintegrating tablet Take 1 Tab by mouth every eight (8) hours as needed for Nausea. 15 Tab 0    raNITIdine (ZANTAC) 150 mg tablet Take 1 Tab by mouth every twelve (12) hours as needed for Indigestion. 60 Tab 11    spironolactone (ALDACTONE) 50 mg tablet Take 1 Tab by mouth three (3) times daily. 90 Tab 6    omeprazole (PRILOSEC) 40 mg capsule Take 1 Cap by mouth daily. 30 Cap 6    LORazepam (ATIVAN) 1 mg tablet Take 1 Tab by mouth every twelve (12) hours as needed for Anxiety. Max Daily Amount: 2 mg. 30 Tab 0    butalbital-acetaminophen-caffeine (FIORICET, ESGIC) -40 mg per tablet Take 1 Tab by mouth every twelve (12) hours as needed for Headache. Max Daily Amount: 2 Tabs. 30 Tab 2    phentermine 8 mg tab Take 1 Tab by mouth daily. Max Daily Amount: 1 Tab. 30 Tab 2         Patient Care Team:  Patient Care Team:  Jose L Quiñonez NP as PCP - General (Nurse Practitioner)        LABS:  None new to review    RADIOLOGY:  None new to review        Physical Exam   Constitutional: She is oriented to person, place, and time. She appears well-developed and well-nourished. No distress.    Pulmonary/Chest: Effort normal. No respiratory distress. Neurological: She is alert and oriented to person, place, and time. She exhibits normal muscle tone. Coordination normal.   Skin: Skin is warm and dry. Rash noted. Rash is papular.        -scattered papules noted with majority around waist line and on trunk; few on bilateral arms; no definite vesicles noted or scabbing   Psychiatric: Her speech is normal and behavior is normal. Her mood appears not anxious. She does not exhibit a depressed mood. Vitals:    09/13/18 0957   BP: 139/88   Pulse: 76   Resp: 20   Temp: 97.2 °F (36.2 °C)   TempSrc: Oral   SpO2: 97%   Weight: 196 lb (88.9 kg)   Height: 5' 5\" (1.651 m)   PainSc:   4   LMP: 08/30/2018         Assessment and Plan    Rash and nonspecific skin eruption  *Discussed with patient about possible causes. Considered varicella zoster; however, she is without any prodromal symptoms and reports having chickenpox in the past as a child- advised that about 2% of people can have a recurrence. *Appears to most likely be a dermatitis- will trial steroid cream.  *I have advised patient that initially rashes may present differently and that at times, they can change later in the course and become more obvious of the cause. I have discussed with her about coming into office or reporting for another evaluation elsewhere if symptoms are changing or rash begins to have changes. - triamcinolone acetonide (KENALOG) 0.1 % topical cream; Apply  to affected area two (2) times a day. use thin layer  Dispense: 45 g; Refill: 1      *This patient's case has been discussed with my collaborating physician, Dr Shanna Garcia, and he has visually reviewed the rash while patient present. He is in agreement with the above assessment and plan. *Plan of care reviewed with patient. Patient in agreement with plan and expresses understanding. All questions answered and patient encouraged to call or RTO if further questions or concerns.        Follow-up Disposition:  Return if symptoms worsen or fail to improve.

## 2018-09-19 ENCOUNTER — TELEPHONE (OUTPATIENT)
Dept: FAMILY MEDICINE CLINIC | Age: 48
End: 2018-09-19

## 2018-09-19 NOTE — TELEPHONE ENCOUNTER
2018  8:01 AM    Chief Complaint   Patient presents with    Visit Follow-up Call       Called patient at this time to follow up on rash from last week. Verified by name and . She reports that the rash has started to fade away and she has not had any new rash. She denies any other symptoms. Rash has been improving. Encouraged to continue monitoring and RTO if it returns. She expresses understanding.

## 2018-11-28 DIAGNOSIS — J01.00 ACUTE NON-RECURRENT MAXILLARY SINUSITIS: ICD-10-CM

## 2018-11-28 DIAGNOSIS — L70.0 ACNE VULGARIS: ICD-10-CM

## 2018-11-28 DIAGNOSIS — R51.9 NONINTRACTABLE EPISODIC HEADACHE, UNSPECIFIED HEADACHE TYPE: ICD-10-CM

## 2018-11-28 NOTE — TELEPHONE ENCOUNTER
Requested Prescriptions     Pending Prescriptions Disp Refills    spironolactone (ALDACTONE) 50 mg tablet 90 Tab 6     Sig: Take 1 Tab by mouth three (3) times daily.  ondansetron (ZOFRAN ODT) 4 mg disintegrating tablet 15 Tab 0     Sig: Take 1 Tab by mouth every eight (8) hours as needed for Nausea.  fluticasone (FLONASE) 50 mcg/actuation nasal spray 1 Bottle 11     Si Sprays by Both Nostrils route daily.  butalbital-acetaminophen-caffeine (FIORICET, ESGIC) -40 mg per tablet 30 Tab 2     Sig: Take 1 Tab by mouth every twelve (12) hours as needed for Headache.

## 2018-11-29 ENCOUNTER — DOCUMENTATION ONLY (OUTPATIENT)
Dept: FAMILY MEDICINE CLINIC | Age: 48
End: 2018-11-29

## 2018-11-29 RX ORDER — BUTALBITAL, ACETAMINOPHEN AND CAFFEINE 50; 325; 40 MG/1; MG/1; MG/1
1 TABLET ORAL
Qty: 30 TAB | Refills: 2 | Status: SHIPPED | OUTPATIENT
Start: 2018-11-29

## 2018-11-29 RX ORDER — ONDANSETRON 4 MG/1
4 TABLET, ORALLY DISINTEGRATING ORAL
Qty: 15 TAB | Refills: 0 | Status: SHIPPED | OUTPATIENT
Start: 2018-11-29

## 2018-11-29 RX ORDER — SPIRONOLACTONE 50 MG/1
50 TABLET, FILM COATED ORAL 3 TIMES DAILY
Qty: 90 TAB | Refills: 0 | Status: SHIPPED | OUTPATIENT
Start: 2018-11-29 | End: 2018-12-11 | Stop reason: SDUPTHER

## 2018-11-29 RX ORDER — FLUTICASONE PROPIONATE 50 MCG
2 SPRAY, SUSPENSION (ML) NASAL DAILY
Qty: 1 BOTTLE | Refills: 11 | Status: SHIPPED | OUTPATIENT
Start: 2018-11-29

## 2018-11-29 NOTE — PROGRESS NOTES
11/29/2018  9:19 AM    Chief Complaint   Patient presents with    Prior Auth     Celebrex       Noted PA needed for Celebrex. Patient has not been into office since 1/2018 for routine care. Evaluated 5/2018 and 9/2018 for acute sick visits. PA completed and approved via CoverMyMeds. Patient does need appointment for routine chronic disease management and evaluation. See other telephone encounter.

## 2018-11-29 NOTE — TELEPHONE ENCOUNTER
Call made to pt and no answer. Received voicemail.  Left message that a 30 refill was done for the requested medication, an appointment is needed for chronic disease follow [phone]

## 2018-11-29 NOTE — TELEPHONE ENCOUNTER
11/29/2018  9:22 AM    Chief Complaint   Patient presents with    Medication Refill       Noted refill request for several medications. Last seen in office for routine care 1/2018 then for acute care sick visits 5/2018 and 9/2018. Needs to be seen for chronic disease routine care- 30 mins in the next few weeks. Refills completed. Forwarded to clinical staff to get patient scheduled- will need labs.        Lab Results   Component Value Date/Time    Sodium 136 12/15/2017 07:55 AM    Potassium 4.4 12/15/2017 07:55 AM    Chloride 101 12/15/2017 07:55 AM    CO2 26 12/15/2017 07:55 AM    Anion gap 9 12/15/2017 07:55 AM    Glucose 79 12/15/2017 07:55 AM    BUN 18 12/15/2017 07:55 AM    Creatinine 0.75 12/15/2017 07:55 AM    BUN/Creatinine ratio 24 (H) 12/15/2017 07:55 AM    GFR est AA >60 12/15/2017 07:55 AM    GFR est non-AA >60 12/15/2017 07:55 AM    Calcium 9.4 12/15/2017 07:55 AM

## 2018-12-11 ENCOUNTER — OFFICE VISIT (OUTPATIENT)
Dept: FAMILY MEDICINE CLINIC | Age: 48
End: 2018-12-11

## 2018-12-11 ENCOUNTER — HOSPITAL ENCOUNTER (OUTPATIENT)
Dept: LAB | Age: 48
Discharge: HOME | End: 2018-12-11
Payer: COMMERCIAL

## 2018-12-11 VITALS
HEIGHT: 65 IN | OXYGEN SATURATION: 96 % | TEMPERATURE: 97.9 F | BODY MASS INDEX: 34.16 KG/M2 | HEART RATE: 79 BPM | RESPIRATION RATE: 20 BRPM | WEIGHT: 205 LBS | DIASTOLIC BLOOD PRESSURE: 76 MMHG | SYSTOLIC BLOOD PRESSURE: 126 MMHG

## 2018-12-11 DIAGNOSIS — Z13.0 SCREENING FOR ENDOCRINE, METABOLIC AND IMMUNITY DISORDER: ICD-10-CM

## 2018-12-11 DIAGNOSIS — F41.8 SITUATIONAL ANXIETY: ICD-10-CM

## 2018-12-11 DIAGNOSIS — Z13.228 SCREENING FOR ENDOCRINE, METABOLIC AND IMMUNITY DISORDER: ICD-10-CM

## 2018-12-11 DIAGNOSIS — K21.9 GASTROESOPHAGEAL REFLUX DISEASE, ESOPHAGITIS PRESENCE NOT SPECIFIED: ICD-10-CM

## 2018-12-11 DIAGNOSIS — L70.0 ACNE VULGARIS: Primary | ICD-10-CM

## 2018-12-11 DIAGNOSIS — Z13.29 SCREENING FOR ENDOCRINE, METABOLIC AND IMMUNITY DISORDER: ICD-10-CM

## 2018-12-11 DIAGNOSIS — Z12.31 ENCOUNTER FOR SCREENING MAMMOGRAM FOR BREAST CANCER: ICD-10-CM

## 2018-12-11 DIAGNOSIS — E66.9 OBESITY (BMI 30-39.9): ICD-10-CM

## 2018-12-11 DIAGNOSIS — R51.9 NONINTRACTABLE EPISODIC HEADACHE, UNSPECIFIED HEADACHE TYPE: ICD-10-CM

## 2018-12-11 DIAGNOSIS — M19.90 ARTHRITIS: ICD-10-CM

## 2018-12-11 DIAGNOSIS — Z01.89 ENCOUNTER FOR LABORATORY EXAMINATION: ICD-10-CM

## 2018-12-11 LAB
ALBUMIN SERPL-MCNC: 3.7 G/DL (ref 3.4–5)
ALBUMIN/GLOB SERPL: 1 {RATIO} (ref 0.8–1.7)
ALP SERPL-CCNC: 130 U/L (ref 45–117)
ALT SERPL-CCNC: 17 U/L (ref 13–56)
ANION GAP SERPL CALC-SCNC: 6 MMOL/L (ref 3–18)
AST SERPL-CCNC: 11 U/L (ref 15–37)
BILIRUB SERPL-MCNC: 0.3 MG/DL (ref 0.2–1)
BUN SERPL-MCNC: 18 MG/DL (ref 7–18)
BUN/CREAT SERPL: 25 (ref 12–20)
CALCIUM SERPL-MCNC: 9.1 MG/DL (ref 8.5–10.1)
CHLORIDE SERPL-SCNC: 107 MMOL/L (ref 100–108)
CO2 SERPL-SCNC: 27 MMOL/L (ref 21–32)
CREAT SERPL-MCNC: 0.72 MG/DL (ref 0.6–1.3)
ERYTHROCYTE [DISTWIDTH] IN BLOOD BY AUTOMATED COUNT: 13.5 % (ref 11.6–14.5)
GLOBULIN SER CALC-MCNC: 3.7 G/DL (ref 2–4)
GLUCOSE SERPL-MCNC: 75 MG/DL (ref 74–99)
HCT VFR BLD AUTO: 43.7 % (ref 35–45)
HGB BLD-MCNC: 14.2 G/DL (ref 12–16)
MCH RBC QN AUTO: 30.3 PG (ref 24–34)
MCHC RBC AUTO-ENTMCNC: 32.5 G/DL (ref 31–37)
MCV RBC AUTO: 93.4 FL (ref 74–97)
PLATELET # BLD AUTO: 241 K/UL (ref 135–420)
PMV BLD AUTO: 11.7 FL (ref 9.2–11.8)
POTASSIUM SERPL-SCNC: 3.9 MMOL/L (ref 3.5–5.5)
PROT SERPL-MCNC: 7.4 G/DL (ref 6.4–8.2)
RBC # BLD AUTO: 4.68 M/UL (ref 4.2–5.3)
SODIUM SERPL-SCNC: 140 MMOL/L (ref 136–145)
WBC # BLD AUTO: 10.1 K/UL (ref 4.6–13.2)

## 2018-12-11 PROCEDURE — 80053 COMPREHEN METABOLIC PANEL: CPT

## 2018-12-11 PROCEDURE — 85027 COMPLETE CBC AUTOMATED: CPT

## 2018-12-11 RX ORDER — CELECOXIB 200 MG/1
CAPSULE ORAL 2 TIMES DAILY
COMMUNITY
End: 2018-12-11 | Stop reason: SDUPTHER

## 2018-12-11 RX ORDER — RANITIDINE 150 MG/1
150 TABLET, FILM COATED ORAL
Qty: 90 TAB | Refills: 3 | Status: SHIPPED | OUTPATIENT
Start: 2018-12-11

## 2018-12-11 RX ORDER — CELECOXIB 200 MG/1
200 CAPSULE ORAL
Qty: 180 CAP | Refills: 1 | Status: SHIPPED | OUTPATIENT
Start: 2018-12-11 | End: 2019-05-09 | Stop reason: SDUPTHER

## 2018-12-11 RX ORDER — SPIRONOLACTONE 50 MG/1
50 TABLET, FILM COATED ORAL 3 TIMES DAILY
Qty: 90 TAB | Refills: 1 | Status: SHIPPED | OUTPATIENT
Start: 2019-02-01 | End: 2019-05-09 | Stop reason: SDUPTHER

## 2018-12-11 RX ORDER — OMEPRAZOLE 40 MG/1
40 CAPSULE, DELAYED RELEASE ORAL DAILY
Qty: 90 CAP | Refills: 1 | Status: SHIPPED | OUTPATIENT
Start: 2018-12-11

## 2018-12-11 RX ORDER — LORAZEPAM 1 MG/1
1 TABLET ORAL
Qty: 30 TAB | Refills: 0 | Status: SHIPPED | OUTPATIENT
Start: 2018-12-11

## 2018-12-11 NOTE — PROGRESS NOTES
OFFICE NOTE Kita Clarke is a 51 y.o. female presenting today for office visit. 12/11/2018 
7:52 AM 
 
Chief Complaint Patient presents with  Follow Up Chronic Condition  
  pt here for follow up chronic conditions  Skin Problem  
  pt c/o dry itching area to right breast  
 Medication Refill  
  pt here for medication refill HPI: Here today for follow up on chronic conditions. Last labs completed 12/2017. Does not follow with any specialists at this time. Acne/GERD: Currently taking Spironolactone TID for adult acne- chronic medication- keeps condition controlled. Originally started by dermatology- wishes to continue. Takes Prilosec or Zantac PRN for control of GERD- has heartburn with eating of red sauce pasta or spicy foods. Headaches/Arthritis: Intermittent headaches that have been happening for years- no changes in chronic headaches. Managed with PRN Fioricet. Taking Celebrex for arthritis that she reports has been diagnosed in bilateral knees- keeps pain controlled but has been increased some since the cold weather. Situational anxiety: Diagnosed with in the past- has trouble at times with stressful times, during her menstrual cycle, and with high bridges. Typically uses Ativan 1 mg about 2-4 times per month. Denies any depression or generalized anxiety. Review of Systems Constitutional: Negative for chills, fatigue and fever. Respiratory: Negative for cough, shortness of breath and wheezing. Cardiovascular: Negative for chest pain, palpitations and leg swelling. Gastrointestinal: Negative for abdominal pain, constipation, diarrhea, nausea and vomiting. Genitourinary: Negative for difficulty urinating and frequency. Musculoskeletal: Positive for arthralgias. Negative for myalgias. Skin: Negative for rash. Neurological: Negative for dizziness and headaches. PHQ Screening PHQ over the last two weeks 12/11/2018 Little interest or pleasure in doing things Not at all Feeling down, depressed, irritable, or hopeless Not at all Total Score PHQ 2 0 History Past Medical History:  
Diagnosis Date  Acne  Arthritis of knee   
 patient reported  GERD (gastroesophageal reflux disease)  Headache  Situational anxiety Past Surgical History:  
Procedure Laterality Date  HX HEENT    
 EYE SURGERY AS A CHILD Social History Socioeconomic History  Marital status:  Spouse name: Not on file  Number of children: Not on file  Years of education: Not on file  Highest education level: Not on file Social Needs  Financial resource strain: Not on file  Food insecurity - worry: Not on file  Food insecurity - inability: Not on file  Transportation needs - medical: Not on file  Transportation needs - non-medical: Not on file Occupational History  Not on file Tobacco Use  Smoking status: Never Smoker  Smokeless tobacco: Never Used Substance and Sexual Activity  Alcohol use: No  
 Drug use: No  
 Sexual activity: Yes Other Topics Concern  Not on file Social History Narrative  Not on file Family History Problem Relation Age of Onset  Cancer Mother Nubia.Sic Arthritis-osteo Mother  Arthritis-osteo Father  Arthritis-osteo Maternal Grandfather Allergies Allergen Reactions  Latex Hives  Tramadol Hives Current Outpatient Medications Medication Sig Dispense Refill  [START ON 2/1/2019] spironolactone (ALDACTONE) 50 mg tablet Take 1 Tab by mouth three (3) times daily. 90 Tab 1  
 LORazepam (ATIVAN) 1 mg tablet Take 1 Tab by mouth every twelve (12) hours as needed for Anxiety. Max Daily Amount: 2 mg. 30 Tab 0  
 omeprazole (PRILOSEC) 40 mg capsule Take 1 Cap by mouth daily. 90 Cap 1  raNITIdine (ZANTAC) 150 mg tablet Take 1 Tab by mouth every twelve (12) hours as needed for Indigestion.  90 Tab 3  
  OTHER Piroxicam 0.2%, Gabapentin 3%, Methocarbamol 2%, Lidocaine 2%, Prilocaine 2% 1-2 grams applied to affected area 3-4 times a day Topical Compound from [address] 180 g 3  
 CELEBREX 200 mg capsule Take  by mouth two (2) times a day.  ondansetron (ZOFRAN ODT) 4 mg disintegrating tablet Take 1 Tab by mouth every eight (8) hours as needed for Nausea. 15 Tab 0  
 fluticasone (FLONASE) 50 mcg/actuation nasal spray 2 Sprays by Both Nostrils route daily. 1 Bottle 11  
 triamcinolone acetonide (KENALOG) 0.1 % topical cream Apply  to affected area two (2) times a day. use thin layer 45 g 1  
 butalbital-acetaminophen-caffeine (FIORICET, ESGIC) -40 mg per tablet Take 1 Tab by mouth every twelve (12) hours as needed for Headache. 30 Tab 2 Advance Care Planning:  
Patient was offered the opportunity to discuss advance care planning NO Does patient have an Advance Directive: If no, did you provide information on [facility]ctions? Patient Care Team: 
Patient Care Team: 
Andre Nevarez NP as PCP - General (Nurse Practitioner) LABS: 
None new to review RADIOLOGY: 
None new to review Physical Exam  
Constitutional: She is oriented to person, place, and time. She appears well-developed and well-nourished. No distress. Neck: Normal range of motion. Neck supple. No thyromegaly present. Cardiovascular: Normal rate, regular rhythm and normal heart sounds. No murmur heard. Pulmonary/Chest: Effort normal and breath sounds normal. No respiratory distress. Abdominal: Soft. Bowel sounds are normal. There is no tenderness. Musculoskeletal: She exhibits no edema. Neurological: She is alert and oriented to person, place, and time. She exhibits normal muscle tone. Coordination and gait normal.  
Skin: Skin is warm and dry. Vitals:  
 12/11/18 0745 BP: 126/76 Pulse: 79 Resp: 20 Temp: 97.9 °F (36.6 °C) TempSrc: Oral  
SpO2: 96% Weight: 205 lb (93 kg) Height: 5' 5\" (1.651 m) PainSc:   8 PainLoc: Knee LMP: 11/20/2018 Assessment and Plan Acne vulgaris *Refilled. Check CMP. - spironolactone (ALDACTONE) 50 mg tablet; Take 1 Tab by mouth three (3) times daily. Dispense: 90 Tab; Refill: 1 Gastroesophageal reflux disease, esophagitis presence not specified *Refilled for PRN use. 
- omeprazole (PRILOSEC) 40 mg capsule; Take 1 Cap by mouth daily. Dispense: 90 Cap; Refill: 1 
- raNITIdine (ZANTAC) 150 mg tablet; Take 1 Tab by mouth every twelve (12) hours as needed for Indigestion. Dispense: 90 Tab; Refill: 3 Nonintractable episodic headache, unspecified headache type *Received refill on Fioricet recently. Stable. Arthritis *Will trial compound cream in addition to Celebrex.  
- OTHER; Piroxicam 0.2%, Gabapentin 3%, Methocarbamol 2%, Lidocaine 2%, Prilocaine 2% 1-2 grams applied to affected area 3-4 times a day Topical Compound from [address] [place] [address]: 180 g; Refill: 3 
- CELEBREX 200 mg capsule; Take 1 Cap by mouth every twelve (12) hours as needed for Pain. Dispense: 180 Cap; Refill: 1 Situational anxiety *Refilled, stable. - LORazepam (ATIVAN) 1 mg tablet; Take 1 Tab by mouth every twelve (12) hours as needed for Anxiety. Max Daily Amount: 2 mg. Dispense: 30 Tab; Refill: 0 Obesity (BMI 30-39.9) *I have reviewed/discussed the above normal BMI with the patient. I have recommended the following interventions: dietary management education, guidance, and counseling, encourage exercise and monitor weight . *She is planning on the Keto diet at the beginning of the year. Encounter for screening mammogram for breast cancer - ERNESTO 3D JUAN W MAMMO BI SCREENING INCL CAD; Future Screening for endocrine, metabolic and immunity disorder 
- CBC W/O DIFF; Future - METABOLIC PANEL, COMPREHENSIVE; Future Encounter for laboratory examination 
- COLLECTION VENOUS BLOOD,VENIPUNCTURE 
 
 
 
 
 *Plan of care reviewed with patient. Patient in agreement with plan and expresses understanding. All questions answered and patient encouraged to call or RTO if further questions or concerns. Follow-up Disposition: 
Return in about 6 months (around 6/11/2019) for chronic disease routine care- 30 min.

## 2018-12-13 ENCOUNTER — TELEPHONE (OUTPATIENT)
Dept: FAMILY MEDICINE CLINIC | Age: 48
End: 2018-12-13

## 2018-12-13 NOTE — TELEPHONE ENCOUNTER
12/13/2018  2:18 PM    Chief Complaint   Patient presents with    Medication Problem       Called [address] at this time- verified that Compound Cream is not covered with insurance and that it is a $80 cost cash payment. Called patient at this time and discussed alternatives such as Voltaren gel but that she would be advised to cut back on Celebrex use. Also advised on Tylenol as needed, ice, and consideration for xrays to evaluate progression so that further intervention can be ordered or done like knee injections. Consider PT as well. She declines all for now but will take Tylenol when needed and ice knees. *Plan of care reviewed with patient. Patient in agreement with plan and expresses understanding. All questions answered and patient encouraged to call or RTO if further questions or concerns.

## 2018-12-13 NOTE — TELEPHONE ENCOUNTER
Patient called to inform Laura Nunn that her insurance will not cover a compound medication for her knees (joint pain) that Laura Nunn prescribed, so she wanted to know if there is an alternative to this medication. Something stronger.  Please contact patient when available to discuss

## 2018-12-21 ENCOUNTER — HOSPITAL ENCOUNTER (OUTPATIENT)
Dept: MAMMOGRAPHY | Age: 48
Discharge: HOME | End: 2018-12-21
Attending: NURSE PRACTITIONER
Payer: COMMERCIAL

## 2018-12-21 DIAGNOSIS — Z12.31 ENCOUNTER FOR SCREENING MAMMOGRAM FOR BREAST CANCER: ICD-10-CM

## 2018-12-21 PROCEDURE — 77063 BREAST TOMOSYNTHESIS BI: CPT

## 2018-12-26 DIAGNOSIS — L70.0 ACNE VULGARIS: ICD-10-CM

## 2018-12-28 RX ORDER — SPIRONOLACTONE 50 MG/1
TABLET, FILM COATED ORAL
Qty: 90 TAB | Refills: 0 | OUTPATIENT
Start: 2018-12-28

## 2018-12-28 NOTE — TELEPHONE ENCOUNTER
12/28/2018  12:56 PM    Chief Complaint   Patient presents with    Medication Refill       Noted [employer] refill request for Spironalactone. This was sent previously on 12/11 for a 90 days supply with 1 refills. No further action needed.

## 2019-01-09 DIAGNOSIS — R92.8 ABNORMALITY OF LEFT BREAST ON SCREENING MAMMOGRAM: Primary | ICD-10-CM

## 2019-01-10 NOTE — PROGRESS NOTES
1/9/2019  8:51 PM      Noted inconclusive screening mammogram left breast. Ordered left breast US and diagnostic mammogram.

## 2019-01-15 ENCOUNTER — TELEPHONE (OUTPATIENT)
Dept: FAMILY MEDICINE CLINIC | Age: 49
End: 2019-01-15

## 2019-01-15 NOTE — TELEPHONE ENCOUNTER
Return call made to pt. No answer at this time. Left message that her message was given to Prince najera, however, she has not address her message yet due to seeing pt today, it is available for her to view and once she views it she will contact her or give instructions on what plan of action to take.

## 2019-01-15 NOTE — TELEPHONE ENCOUNTER
Patient would like to be contacted by Thayne Eisenmenger to discuss her options for gettina a [address] 2 procedure. Stated she recently had a mammogram and they found an issue. Patient is concerned because her mother had breast cancer.  Please contact patient when available to discuss this matter

## 2019-01-16 NOTE — TELEPHONE ENCOUNTER
1/16/2019  8:50 AM    Chief Complaint   Patient presents with    Advice Only     BRCA testing       Noted patient call and inquiry about BRCA testing. Called [facility] through [facility] at this time to get requisition to order genetic testing.  states that pre-auth may be needed for this testing. Awaiting fax for requisition and steps for process.

## 2019-01-16 NOTE — TELEPHONE ENCOUNTER
1/16/2019  12:00 PM    Chief Complaint   Patient presents with    Advice Only     BRCA testing       Received form from [facility]. Called patient at this time- informed that there is a form here that she needs to complete and sign so that prior authorization can be obtained from her insurance. She reports that she will be in today or tomorrow to complete.

## 2019-01-18 NOTE — TELEPHONE ENCOUNTER
1/18/2019  10:41 AM    Chief Complaint   Patient presents with    Advice Only     BRCA testing       Patient completed her portion of form and signed. Faxed to [employer] to start prior authorization.

## 2019-01-25 ENCOUNTER — HOSPITAL ENCOUNTER (OUTPATIENT)
Dept: MAMMOGRAPHY | Age: 49
Discharge: HOME | End: 2019-01-25
Attending: NURSE PRACTITIONER
Payer: COMMERCIAL

## 2019-01-25 ENCOUNTER — HOSPITAL ENCOUNTER (OUTPATIENT)
Dept: ULTRASOUND IMAGING | Age: 49
Discharge: HOME | End: 2019-01-25
Attending: NURSE PRACTITIONER
Payer: COMMERCIAL

## 2019-01-25 DIAGNOSIS — R92.8 ABNORMALITY OF LEFT BREAST ON SCREENING MAMMOGRAM: ICD-10-CM

## 2019-01-25 PROCEDURE — 76642 ULTRASOUND BREAST LIMITED: CPT

## 2019-01-25 PROCEDURE — 77061 BREAST TOMOSYNTHESIS UNI: CPT

## 2019-03-12 ENCOUNTER — TELEPHONE (OUTPATIENT)
Dept: FAMILY MEDICINE CLINIC | Age: 49
End: 2019-03-12

## 2019-03-19 ENCOUNTER — TELEPHONE (OUTPATIENT)
Dept: FAMILY MEDICINE CLINIC | Age: 49
End: 2019-03-19

## 2019-03-19 NOTE — TELEPHONE ENCOUNTER
Geovanna Villalobos from [facility] called to inform Samantha Bonner that this patient needs BRCA. Stated she needs the Ins, the ICD 10 code and the clinical notes for her condition.  Any questions please contact Geovanna Villalobos

## 2019-03-19 NOTE — TELEPHONE ENCOUNTER
3/19/2019  5:06 PM    Chief Complaint   Patient presents with    New Order     BRCA testing details       Noted call from [facility] regarding further information.  Faxed back order with ICD 10 code written on it, as well as insurance and telephone office note regarding concern of family history of breast cancer (ICD Z80.3)

## 2019-03-26 ENCOUNTER — TELEPHONE (OUTPATIENT)
Dept: FAMILY MEDICINE CLINIC | Age: 49
End: 2019-03-26

## 2019-03-26 ENCOUNTER — OFFICE VISIT (OUTPATIENT)
Dept: FAMILY MEDICINE CLINIC | Age: 49
End: 2019-03-26

## 2019-03-26 VITALS
HEART RATE: 74 BPM | BODY MASS INDEX: 36.32 KG/M2 | OXYGEN SATURATION: 97 % | RESPIRATION RATE: 20 BRPM | WEIGHT: 218 LBS | TEMPERATURE: 97.1 F | HEIGHT: 65 IN | DIASTOLIC BLOOD PRESSURE: 83 MMHG | SYSTOLIC BLOOD PRESSURE: 131 MMHG

## 2019-03-26 DIAGNOSIS — B37.2 CANDIDAL SKIN INFECTION: Primary | ICD-10-CM

## 2019-03-26 DIAGNOSIS — E66.9 OBESITY (BMI 30-39.9): ICD-10-CM

## 2019-03-26 RX ORDER — NYSTATIN 100000 U/G
CREAM TOPICAL
Qty: 30 G | Refills: 11 | Status: SHIPPED | OUTPATIENT
Start: 2019-03-26 | End: 2019-03-26 | Stop reason: SDUPTHER

## 2019-03-26 RX ORDER — NYSTATIN 100000 [USP'U]/G
POWDER TOPICAL
Qty: 30 G | Refills: 11 | Status: SHIPPED | OUTPATIENT
Start: 2019-03-26

## 2019-03-26 RX ORDER — NYSTATIN 100000 U/G
CREAM TOPICAL
Qty: 30 G | Refills: 11 | Status: SHIPPED | OUTPATIENT
Start: 2019-03-26

## 2019-03-26 RX ORDER — NYSTATIN 100000 [USP'U]/G
POWDER TOPICAL
Qty: 30 G | Refills: 11 | Status: SHIPPED | OUTPATIENT
Start: 2019-03-26 | End: 2019-03-26 | Stop reason: SDUPTHER

## 2019-03-26 NOTE — PATIENT INSTRUCTIONS
Learning About Cutting Calories  How do calories affect your weight? Food gives your body energy. Energy from the food you eat is measured in calories. This energy keeps your heart beating, your brain active, and your muscles working. Your body needs a certain number of calories each day. After your body uses the calories it needs, it stores extra calories as fat. To lose weight safely, you have to eat fewer calories while eating in a healthy way. How many calories do you need each day? The more active you are, the more calories you need. When you are less active, you need fewer calories. How many calories you need each day also depends on several things, including your age and whether you are male or female. Here are some general guidelines for adults:  · Less active women and older adults need 1,600 to 2,000 calories each day. · Active women and less active men need 2,000 to 2,400 calories each day. · Active men need 2,400 to 3,000 calories each day. How can you cut calories and eat healthy meals? Whole grains, vegetables and fruits, and dried beans are good lower-calorie foods. They give you lots of nutrients and fiber. And they fill you up. Sweets, energy drinks, and soda pop are high in calories. They give you few nutrients and no fiber. Try to limit soda pop, fruit juice, and energy drinks. Drink water instead. Some fats can be part of a healthy diet. But cutting back on fats from highly processed foods like fast foods and many snack foods is a good way to lower the calories in your diet. Also, use smaller amounts of fats like butter, margarine, salad dressing, and mayonnaise. Add fresh garlic, lemon, or herbs to your meals to add flavor without adding fat. Meats and dairy products can be a big source of hidden fats. Try to choose lean or low-fat versions of these products. Fat-free cookies, candies, chips, and frozen treats can still be high in sugar and calories.  Some fat-free foods have more calories than regular ones. Eat fat-free treats in moderation, as you would other foods. If your favorite foods are high in fat, salt, sugar, or calories, limit how often you eat them. Eat smaller servings, or look for healthy substitutes. Fill up on fruits, vegetables, and whole grains. Eating at home  · Use meat as a side dish instead of as the main part of your meal.  · Try main dishes that use whole wheat pasta, brown rice, dried beans, or vegetables. · Find ways to cook with little or no fat, such as broiling, steaming, or grilling. · Use cooking spray instead of oil. If you use oil, use a monounsaturated oil, such as canola or olive oil. · Trim fat from meats before you cook them. · Drain off fat after you brown the meat or while you roast it. · Chill soups and stews after you cook them. Then skim the fat off the top after it hardens. Eating out  · Order foods that are broiled or poached rather than fried or breaded. · Cut back on the amount of butter or margarine that you use on bread. · Order sauces, gravies, and salad dressings on the side, and use only a little. · When you order pasta, choose tomato sauce rather than cream sauce. · Ask for salsa with your baked potato instead of sour cream, butter, cheese, or bailey. · Order meals in a small size instead of upgrading to a large. · Share an entree, or take part of your food home to eat as another meal.  · Share appetizers and desserts. Where can you learn more? Go to http://meme-chai.info/. Enter 99 [id number] in the search box to learn more about \"Learning About Cutting Calories. \"  Current as of: March 28, 2018  Content Version: 11.9  © 2328-5708 [employer]. Care instructions adapted under license by [employer] (which disclaims liability or warranty for this information).  If you have questions about a medical condition or this instruction, always ask your healthcare professional. [employer] disclaims any warranty or liability for your use of this information. Starting a Weight Loss Plan: Care Instructions  Your Care Instructions    If you are thinking about losing weight, it can be hard to know where to start. Your doctor can help you set up a weight loss plan that best meets your needs. You may want to take a class on nutrition or exercise, or join a weight loss support group. If you have questions about how to make changes to your eating or exercise habits, ask your doctor about seeing a registered dietitian or an exercise specialist.  It can be a big challenge to lose weight. But you do not have to make huge changes at once. Make small changes, and stick with them. When those changes become habit, add a few more changes. If you do not think you are ready to make changes right now, try to pick a date in the future. Make an appointment to see your doctor to discuss whether the time is right for you to start a plan. Follow-up care is a key part of your treatment and safety. Be sure to make and go to all appointments, and call your doctor if you are having problems. It's also a good idea to know your test results and keep a list of the medicines you take. How can you care for yourself at home? · Set realistic goals. Many people expect to lose much more weight than is likely. A weight loss of 5% to 10% of your body weight may be enough to improve your health. · Get family and friends involved to provide support. Talk to them about why you are trying to lose weight, and ask them to help. They can help by participating in exercise and having meals with you, even if they may be eating something different. · Find what works best for you. If you do not have time or do not like to cook, a program that offers meal replacement bars or shakes may be better for you.  Or if you like to prepare meals, finding a plan that includes daily menus and recipes may be best.  · Ask your doctor about other health professionals who can help you achieve your weight loss goals. ? A dietitian can help you make healthy changes in your diet. ? An exercise specialist or  can help you develop a safe and effective exercise program.  ? A counselor or psychiatrist can help you cope with issues such as depression, anxiety, or family problems that can make it hard to focus on weight loss. · Consider joining a support group for people who are trying to lose weight. Your doctor can suggest groups in your area. Where can you learn more? Go to http://meme-chai.info/. Enter M292 in the search box to learn more about \"Starting a Weight Loss Plan: Care Instructions. \"  Current as of: June 25, 2018  Content Version: 11.9  © 3086-2403 [employer]. Care instructions adapted under license by [employer] (which disclaims liability or warranty for this information). If you have questions about a medical condition or this instruction, always ask your healthcare professional. [address] any warranty or liability for your use of this information.

## 2019-03-26 NOTE — LETTER
NOTIFICATION RETURN TO WORK / SCHOOL 
 
3/26/2019 9:23 AM 
 
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 She will return to work/school on: 03/26/19 If there are questions or concerns please have the patient contact our office.  
 
 
 
Sincerely, 
 
 
Michelle Venegas NP

## 2019-03-26 NOTE — TELEPHONE ENCOUNTER
3/26/2019  5:18 PM    Chief Complaint   Patient presents with    Medication Evaluation     Medication Dosage Verification       Phentermine 8 mg is also known as Lomaira. Forwarded to clinical staff to make pharmacy aware.

## 2019-03-26 NOTE — TELEPHONE ENCOUNTER
Francis from [address] [place] [address] is calling regarding this patient's medication Phentermine 8mg. . Stated this medication does not come in 8mg it comes in a 37.5 or 15mg or 30mg.  Please call the pharmacy to clarify this dosage when available

## 2019-03-27 NOTE — PROGRESS NOTES
OFFICE NOTE    Aydee Dudley is a 51 y.o. female presenting today for office visit. 3/26/2019  7:50 AM      Chief Complaint   Patient presents with    Rash     pt c/o having a rash to her perineium area         HPI: Patient states rash appeared about 2 months ago after wearing an extra large pad which irritated her skin. Since then, she has not been able to get rash under control and it has turned into more of a raw, moist irritated area that burns and itches. Tight clothing, especially underwear, makes burning and itching worse, and heat/sweating also irritates the skin. For relief the patient has tried gold bond womens powder- which she felt made her more uncomfortable because the powder clumped up, nahomi goop, monistat cream, and is now using tonys butt paste (zinc oxide baby diaper cream). She does not feel like any of the creams she has used have worked. She also tries to avoid tight fitting underwear, and has switched to more boxer briefs that come down further on thighs, and when she is home she wears loose fitting pajama pants without underwear. She sleeps with a fan under her sheets to try air the area out as well. She denies systemic symptoms such as fever, or any other areas of irritation. Patient also wishing to discuss weight loss. States she has used Phentermine and Qysmia in the past with success, and contrave which caused severe nausea/vomiting. She is working with a nutritionist on a meal plan, but hopes to lose 70 lbs and has not seen any progress so far. She has been working on lifestyle changes for a few weeks and plans to begin incorporating in physical activity soon. Vitals 3/26/2019 12/11/2018 9/13/2018 5/29/2018   Weight 218 lb 205 lb 196 lb 201 lb     Vitals 4/18/2018 1/10/2018 12/13/2017   Weight 199 lb 202 lb 204 lb         Review of Systems   Constitutional: Negative for chills, fatigue and fever.    Endocrine: Negative for cold intolerance, heat intolerance, polydipsia, polyphagia and polyuria. Genitourinary: Negative for dysuria, frequency, pelvic pain and vaginal discharge. Skin: Positive for rash.          PHQ Screening   3 most recent PHQ Screens 3/26/2019   Little interest or pleasure in doing things Not at all   Feeling down, depressed, irritable, or hopeless Not at all   Total Score PHQ 2 0         History  Past Medical History:   Diagnosis Date    Acne     Arthritis of knee     patient reported    GERD (gastroesophageal reflux disease)     Headache     Situational anxiety        Past Surgical History:   Procedure Laterality Date    HX HEENT      EYE SURGERY AS A CHILD       Social History     Socioeconomic History    Marital status:      Spouse name: Not on file    Number of children: Not on file    Years of education: Not on file    Highest education level: Not on file   Occupational History    Not on file   Social Needs    Financial resource strain: Not on file    Food insecurity:     Worry: Not on file     Inability: Not on file    Transportation needs:     Medical: Not on file     Non-medical: Not on file   Tobacco Use    Smoking status: Never Smoker    Smokeless tobacco: Never Used   Substance and Sexual Activity    Alcohol use: No    Drug use: No    Sexual activity: Yes   Lifestyle    Physical activity:     Days per week: Not on file     Minutes per session: Not on file    Stress: Not on file   Relationships    Social connections:     Talks on phone: Not on file     Gets together: Not on file     Attends Restorationism service: Not on file     Active member of club or organization: Not on file     Attends meetings of clubs or organizations: Not on file     Relationship status: Not on file    Intimate partner violence:     Fear of current or ex partner: Not on file     Emotionally abused: Not on file     Physically abused: Not on file     Forced sexual activity: Not on file   Other Topics Concern    Not on file   Social History Narrative    Not on file       Allergies   Allergen Reactions    Latex Hives    Tramadol Hives       Current Outpatient Medications   Medication Sig Dispense Refill    phentermine 8 mg tab Take 8 mg by mouth daily. Max Daily Amount: 8 mg. 30 Tab 0    nystatin (MYCOSTATIN) topical cream Apply  to affected area three (3) times daily as needed for Skin Irritation. 30 g 11    nystatin (MYCOSTATIN) powder Apply  to affected area every eight (8) hours as needed (rash). 30 g 11    LORazepam (ATIVAN) 1 mg tablet Take 1 Tab by mouth every twelve (12) hours as needed for Anxiety. Max Daily Amount: 2 mg. 30 Tab 0    raNITIdine (ZANTAC) 150 mg tablet Take 1 Tab by mouth every twelve (12) hours as needed for Indigestion. 90 Tab 3    CELEBREX 200 mg capsule Take 1 Cap by mouth every twelve (12) hours as needed for Pain. 180 Cap 1    ondansetron (ZOFRAN ODT) 4 mg disintegrating tablet Take 1 Tab by mouth every eight (8) hours as needed for Nausea. 15 Tab 0    fluticasone (FLONASE) 50 mcg/actuation nasal spray 2 Sprays by Both Nostrils route daily. 1 Bottle 11    butalbital-acetaminophen-caffeine (FIORICET, ESGIC) -40 mg per tablet Take 1 Tab by mouth every twelve (12) hours as needed for Headache. 30 Tab 2    spironolactone (ALDACTONE) 50 mg tablet Take 1 Tab by mouth three (3) times daily. 90 Tab 1    omeprazole (PRILOSEC) 40 mg capsule Take 1 Cap by mouth daily. 90 Cap 1    triamcinolone acetonide (KENALOG) 0.1 % topical cream Apply  to affected area two (2) times a day. use thin layer 45 g 1         Patient Care Team:  Patient Care Team:  Job Max NP as PCP - General (Nurse Practitioner)        LABS:    Lab Results   Component Value Date/Time    TSH 1.89 12/15/2017 07:55 AM       RADIOLOGY:  None new to review      Physical Exam   Constitutional: She is oriented to person, place, and time. She appears well-developed and well-nourished. No distress.    Cardiovascular: Normal rate, regular rhythm and normal heart sounds. No murmur heard. Pulmonary/Chest: Effort normal and breath sounds normal. No respiratory distress. Neurological: She is alert and oriented to person, place, and time. She exhibits normal muscle tone. Coordination normal.   Skin: Skin is warm and dry. Rash noted. +irritated, mildly reddened rash noted bilateral groin areas; patch like in nature   Psychiatric: Her speech is normal and behavior is normal. Her mood appears not anxious. She does not exhibit a depressed mood. Vitals:    03/26/19 0745   BP: 131/83   Pulse: 74   Resp: 20   Temp: 97.1 °F (36.2 °C)   TempSrc: Oral   SpO2: 97%   Weight: 218 lb (98.9 kg)   Height: 5' 5\" (1.651 m)   PainSc:   0 - No pain         Assessment and Plan    Candidal skin infection  - nystatin (MYCOSTATIN) topical cream; Apply  to affected area three (3) times daily as needed for Skin Irritation. Dispense: 30 g; Refill: 11  - nystatin (MYCOSTATIN) powder; Apply  to affected area every eight (8) hours as needed (rash). Dispense: 30 g; Refill: 11    Obesity (BMI 30-39.9)  *Discussed with patient. She is at her highest weight- she did have some success in the past with pharmacological agents but always gained the weight back when off of the medication. She reports working with a nutritionist now and I have encouraged her to consume less than 1500 calories per day and incorporate in physical activity at least a few days a week. Will trial low dose Phentermine. Follow up in a few weeks. - phentermine 8 mg tab; Take 8 mg by mouth daily. Max Daily Amount: 8 mg. Dispense: 30 Tab; Refill: 0        *Plan of care reviewed with patient. Patient in agreement with plan and expresses understanding. All questions answered and patient encouraged to call or RTO if further questions or concerns. Follow-up and Dispositions    · Return in about 2 weeks (around 4/9/2019) for weight management follow up- 15 mins. Guicho Lee

## 2019-03-27 NOTE — TELEPHONE ENCOUNTER
Call made to pharmacy and clarification made on the dosage of 8 mg. Medication was ordered for pt and has arrived.

## 2019-03-29 ENCOUNTER — TELEPHONE (OUTPATIENT)
Dept: FAMILY MEDICINE CLINIC | Age: 49
End: 2019-03-29

## 2019-03-29 NOTE — TELEPHONE ENCOUNTER
3/29/2019  2:54 PM    Chief Complaint   Patient presents with    Advice Only       Noted call from [facility] with [address]- called back at this time. Verified that I am aware of the patient's medical history and family history. Also verified that the patient understands the risks associated with having genetic testing. Patient has completed mammography. Completed call and [facility] reports they can no proceed with getting authorization.

## 2019-03-29 NOTE — TELEPHONE ENCOUNTER
Teddy Bello is calling from [employer] regarding this patient BRCA testing, trying to verify if the ordering dr was able to view the medical history with this test. If the dr discuss the risk and conditions of having it. Was there any other source of testing offered to the patient.  Need clarification regarding this matter

## 2019-04-19 LAB
Lab: NORMAL
SPECIMEN STATUS REPORT, ROLRST: NORMAL

## 2019-05-01 ENCOUNTER — TELEPHONE (OUTPATIENT)
Dept: FAMILY MEDICINE CLINIC | Age: 49
End: 2019-05-01

## 2019-05-01 DIAGNOSIS — E66.9 OBESITY (BMI 30-39.9): ICD-10-CM

## 2019-05-01 NOTE — TELEPHONE ENCOUNTER
Patient called requesting order for BRCA test. She wants to have this done at [facility]. Requested Prescriptions     Pending Prescriptions Disp Refills    phentermine 8 mg tab 30 Tab 0     Sig: Take 8 mg by mouth daily. Max Daily Amount: 8 mg. Patient also requesting refill. She is aware this medication cannot be sent to the pharmacy.     LV - 3/26/19

## 2019-05-02 NOTE — TELEPHONE ENCOUNTER
5/2/2019  8:24 AM    Chief Complaint   Patient presents with    New Order       Noted patient request for refill on Phentermine. Last seen in office 3/26 and given this Rx- instructed to follow up in a few weeks. No upcoming appointment noted. Needs appointment scheduled and then will print her an Rx to take until she can be seen- need to assess BP and HR while on medication to determine if it can be continued. Also, I mailed patient all the information for the BRCA testing on 4/25. There will be a lab slip in there and the approval to have the testing done. Forwarded to clinical staff to make patient aware and get scheduled. Once scheduled, can do temporary Phentermine Rx that she can come  in office.

## 2019-05-02 NOTE — TELEPHONE ENCOUNTER
Call made to pt and pt made aware that she can  the order for her labs, has already  medication script. Pt verbalizes an understanding.

## 2019-05-02 NOTE — TELEPHONE ENCOUNTER
Call made to pt and 2 identifiers used. Pt made aware of appointment is need for her to be evaluated, temporary supply can be sent to the pharmacy to last her until her appointment. Appointment scheduled for 5/7. Pt made aware that the order was mailed to her on 04/25. Pt states she has not received it yet and is requesting that she come to  the order along with the script.

## 2019-05-07 ENCOUNTER — TELEPHONE (OUTPATIENT)
Dept: FAMILY MEDICINE CLINIC | Age: 49
End: 2019-05-07

## 2019-05-07 ENCOUNTER — OFFICE VISIT (OUTPATIENT)
Dept: FAMILY MEDICINE CLINIC | Age: 49
End: 2019-05-07

## 2019-05-07 VITALS
HEART RATE: 79 BPM | RESPIRATION RATE: 18 BRPM | DIASTOLIC BLOOD PRESSURE: 83 MMHG | BODY MASS INDEX: 35.49 KG/M2 | HEIGHT: 65 IN | TEMPERATURE: 96.8 F | WEIGHT: 213 LBS | SYSTOLIC BLOOD PRESSURE: 128 MMHG | OXYGEN SATURATION: 97 %

## 2019-05-07 DIAGNOSIS — R23.8 SCALP IRRITATION: ICD-10-CM

## 2019-05-07 DIAGNOSIS — E66.9 OBESITY (BMI 30-39.9): Primary | ICD-10-CM

## 2019-05-07 NOTE — PROGRESS NOTES
Chief Complaint   Patient presents with    Weight Management    Request For New Medication     1. Have you been to the ER, urgent care clinic since your last visit? Hospitalized since your last visit? No    2. Have you seen or consulted any other health care providers outside of the [address] [place] [address] since your last visit? Include any pap smears or colon screening.  No

## 2019-05-07 NOTE — PROGRESS NOTES
OFFICE NOTE    Sabrina Jules is a 51 y.o. female presenting today for office visit. 5/7/2019  7:55 AM      Chief Complaint   Patient presents with    Weight Management    Request For New Medication         HPI: Here today for weight management follow up. Started onto Phentermine 8 mg about 6 weeks ago- did run out for short period of time but is now back on. She reports that according to her home scale, she has lost about 5 lbs. In the past, she has used Phentermine and Qysmia in the past with success, and contrave which caused severe nausea/vomiting. She reports she has been working with a nutritionist and is on a meal plan. She plans to begin incorporating in physical activity soon. No SE of medication. Also would like to get ketoconazole shampoo- has had in past for dry flaky scalp and worked well. Review of Systems   Constitutional: Negative for chills, fatigue and fever. Respiratory: Negative for cough, shortness of breath and wheezing. Cardiovascular: Negative for chest pain, palpitations and leg swelling. Gastrointestinal: Negative for abdominal pain, constipation, diarrhea, nausea and vomiting. Genitourinary: Negative for difficulty urinating and frequency. Musculoskeletal: Negative for arthralgias and myalgias. Skin: Negative for rash. +dry flaky scalp   Neurological: Negative for dizziness and headaches.          PHQ Screening   3 most recent PHQ Screens 5/7/2019   Little interest or pleasure in doing things Not at all   Feeling down, depressed, irritable, or hopeless Not at all   Total Score PHQ 2 0         History  Past Medical History:   Diagnosis Date    Acne     Arthritis of knee     patient reported    GERD (gastroesophageal reflux disease)     Headache     Situational anxiety        Past Surgical History:   Procedure Laterality Date    HX HEENT      EYE SURGERY AS A CHILD       Social History     Socioeconomic History    Marital status:  Spouse name: Not on file    Number of children: Not on file    Years of education: Not on file    Highest education level: Not on file   Occupational History    Not on file   Social Needs    Financial resource strain: Not on file    Food insecurity:     Worry: Not on file     Inability: Not on file    Transportation needs:     Medical: Not on file     Non-medical: Not on file   Tobacco Use    Smoking status: Never Smoker    Smokeless tobacco: Never Used   Substance and Sexual Activity    Alcohol use: No    Drug use: No    Sexual activity: Yes   Lifestyle    Physical activity:     Days per week: Not on file     Minutes per session: Not on file    Stress: Not on file   Relationships    Social connections:     Talks on phone: Not on file     Gets together: Not on file     Attends Sabianism service: Not on file     Active member of club or organization: Not on file     Attends meetings of clubs or organizations: Not on file     Relationship status: Not on file    Intimate partner violence:     Fear of current or ex partner: Not on file     Emotionally abused: Not on file     Physically abused: Not on file     Forced sexual activity: Not on file   Other Topics Concern    Not on file   Social History Narrative    Not on file       Allergies   Allergen Reactions    Latex Hives    Tramadol Hives       Current Outpatient Medications   Medication Sig Dispense Refill    phentermine (LOMAIRA) 8 mg tab Take 1 Tab by mouth daily. Max Daily Amount: 1 Tab. 30 Tab 1    ketoconazole 1 % sham Use daily as needed for scalp irritation 200 mL 11    nystatin (MYCOSTATIN) topical cream Apply  to affected area three (3) times daily as needed for Skin Irritation. 30 g 11    nystatin (MYCOSTATIN) powder Apply  to affected area every eight (8) hours as needed (rash). 30 g 11    spironolactone (ALDACTONE) 50 mg tablet Take 1 Tab by mouth three (3) times daily.  90 Tab 1    LORazepam (ATIVAN) 1 mg tablet Take 1 Tab by mouth every twelve (12) hours as needed for Anxiety. Max Daily Amount: 2 mg. 30 Tab 0    omeprazole (PRILOSEC) 40 mg capsule Take 1 Cap by mouth daily. 90 Cap 1    raNITIdine (ZANTAC) 150 mg tablet Take 1 Tab by mouth every twelve (12) hours as needed for Indigestion. 90 Tab 3    CELEBREX 200 mg capsule Take 1 Cap by mouth every twelve (12) hours as needed for Pain. 180 Cap 1    ondansetron (ZOFRAN ODT) 4 mg disintegrating tablet Take 1 Tab by mouth every eight (8) hours as needed for Nausea. 15 Tab 0    fluticasone (FLONASE) 50 mcg/actuation nasal spray 2 Sprays by Both Nostrils route daily. 1 Bottle 11    butalbital-acetaminophen-caffeine (FIORICET, ESGIC) -40 mg per tablet Take 1 Tab by mouth every twelve (12) hours as needed for Headache. 30 Tab 2    triamcinolone acetonide (KENALOG) 0.1 % topical cream Apply  to affected area two (2) times a day. use thin layer 45 g 1         Patient Care Team:  Patient Care Team:  Catrina Dunn NP as PCP - General (Nurse Practitioner)        LABS:  None new to review    RADIOLOGY:  None new to review      Physical Exam   Constitutional: She is oriented to person, place, and time. She appears well-developed and well-nourished. No distress. Neck: Normal range of motion. Neck supple. Cardiovascular: Normal rate, regular rhythm and normal heart sounds. No murmur heard. Pulmonary/Chest: Effort normal and breath sounds normal. No respiratory distress. Abdominal: Soft. Bowel sounds are normal. There is no tenderness. Musculoskeletal: She exhibits no edema. Neurological: She is alert and oriented to person, place, and time. She exhibits normal muscle tone.  Coordination and gait normal.   Skin: Skin is warm and dry.   -no scalp abnormalities at this time         Vitals:    05/07/19 0737 05/07/19 0740   BP: 132/89 128/83   Pulse: 71 79   Resp: 18    Temp: 96.8 °F (36 °C)    TempSrc: Oral    SpO2: 97%    Weight: 213 lb (96.6 kg)    Height: 5' 5\" (1.651 m) Wt Readings from Last 3 Encounters:   05/07/19 213 lb (96.6 kg)   03/26/19 218 lb (98.9 kg)   12/11/18 205 lb (93 kg)       Assessment and Plan    Obesity (BMI 30-39.9)  *Continue low dose Phentermine dosing. Encouraged to continue with lifestyle change. Will follow with her every 2 months for weigh-ins.   - phentermine (LOMAIRA) 8 mg tab; Take 1 Tab by mouth daily. Max Daily Amount: 1 Tab. Dispense: 30 Tab; Refill: 1    Scalp irritation  - ketoconazole 1 % sham; Use daily as needed for scalp irritation  Dispense: 200 mL; Refill: 11        *Plan of care reviewed with patient. Patient in agreement with plan and expresses understanding. All questions answered and patient encouraged to call or RTO if further questions or concerns. Follow-up and Dispositions    · Return in about 2 months (around 7/7/2019) for weight management follow up- 15 mins. Joy Thomas

## 2019-05-09 DIAGNOSIS — M19.90 ARTHRITIS: ICD-10-CM

## 2019-05-09 DIAGNOSIS — L70.0 ACNE VULGARIS: ICD-10-CM

## 2019-05-09 RX ORDER — SPIRONOLACTONE 50 MG/1
50 TABLET, FILM COATED ORAL 3 TIMES DAILY
Qty: 90 TAB | Refills: 1 | Status: SHIPPED | OUTPATIENT
Start: 2019-05-09 | End: 2019-09-25 | Stop reason: SDUPTHER

## 2019-05-09 RX ORDER — CELECOXIB 200 MG/1
200 CAPSULE ORAL
Qty: 180 CAP | Refills: 1 | Status: SHIPPED | OUTPATIENT
Start: 2019-05-09

## 2019-05-09 RX ORDER — CELECOXIB 200 MG/1
CAPSULE ORAL
Qty: 60 CAP | Refills: 5 | OUTPATIENT
Start: 2019-05-09

## 2019-05-09 RX ORDER — SPIRONOLACTONE 50 MG/1
TABLET, FILM COATED ORAL
Qty: 90 TAB | Refills: 5 | OUTPATIENT
Start: 2019-05-09

## 2019-05-21 LAB
BRCA1+BRCA2 MUT ANL BLD/T: NORMAL
LAB DIRECTOR NAME PROVIDER: NORMAL
PREAUTHORIZATION, 252919: NORMAL
SPECIMEN PREPARATION: NORMAL
SPECIMEN SOURCE: NORMAL
SPECIMEN STATUS REPORT, ROLRST: NORMAL

## 2019-08-26 DIAGNOSIS — E66.9 OBESITY (BMI 30-39.9): ICD-10-CM

## 2019-08-29 RX ORDER — PHENTERMINE HYDROCHLORIDE 8 MG/1
TABLET ORAL
Qty: 30 TAB | Refills: 0 | OUTPATIENT
Start: 2019-08-29

## 2019-08-29 RX ORDER — PHENTERMINE HYDROCHLORIDE 8 MG/1
TABLET ORAL
Qty: 15 TAB | Refills: 0 | OUTPATIENT
Start: 2019-08-29

## 2019-08-30 NOTE — TELEPHONE ENCOUNTER
Call made to pt and no answer. Left message in reference to an appointment is needed to get medication refill. Give the office a call to schedule at [phone].

## 2019-09-23 DIAGNOSIS — L70.0 ACNE VULGARIS: ICD-10-CM

## 2019-09-25 RX ORDER — SPIRONOLACTONE 50 MG/1
50 TABLET, FILM COATED ORAL 3 TIMES DAILY
Qty: 90 TAB | Refills: 0 | Status: SHIPPED | OUTPATIENT
Start: 2019-09-25

## 2019-09-25 RX ORDER — SPIRONOLACTONE 50 MG/1
TABLET, FILM COATED ORAL
Qty: 90 TAB | Refills: 0 | OUTPATIENT
Start: 2019-09-25

## 2019-12-05 DIAGNOSIS — L70.0 ACNE VULGARIS: ICD-10-CM

## 2019-12-05 RX ORDER — SPIRONOLACTONE 50 MG/1
50 TABLET, FILM COATED ORAL 3 TIMES DAILY
Qty: 90 TAB | Refills: 0 | OUTPATIENT
Start: 2019-12-05

## 2019-12-05 NOTE — TELEPHONE ENCOUNTER
Requested Prescriptions     Pending Prescriptions Disp Refills    spironolactone (ALDACTONE) 50 mg tablet 90 Tab 0     Sig: Take 1 Tab by mouth three (3) times daily.  NO FURTHER REFILLS WITHOUT OFFICE VISIT

## 2019-12-05 NOTE — TELEPHONE ENCOUNTER
Please see message    The last prescription refill was 9-25-19, #90 and no refills, and patient was informed per [facility] no further refills would be available until patient is seen in the office.     Please advise-thank you

## 2019-12-05 NOTE — TELEPHONE ENCOUNTER
Appointment required, please call patient and let her know to schedule appointment before further refills given.

## 2019-12-06 NOTE — TELEPHONE ENCOUNTER
Attempted to contact patient without success. The number provided is no longer in service or has been changed.   No voicemail options available

## 2019-12-11 ENCOUNTER — TELEPHONE (OUTPATIENT)
Dept: FAMILY MEDICINE CLINIC | Age: 49
End: 2019-12-11

## 2019-12-11 NOTE — TELEPHONE ENCOUNTER
Attempted to contact patient to advise her that her medication-Celebrex was not authorized through her insurance company-it was denied due to the patient wanting the name brand medicine and not the generic. The number we have listed is no longer in service. Will contact the requesting pharmacy.   ([facility])